# Patient Record
Sex: MALE | Race: WHITE | Employment: FULL TIME | URBAN - METROPOLITAN AREA
[De-identification: names, ages, dates, MRNs, and addresses within clinical notes are randomized per-mention and may not be internally consistent; named-entity substitution may affect disease eponyms.]

---

## 2018-01-25 ENCOUNTER — OFFICE VISIT (OUTPATIENT)
Dept: FAMILY MEDICINE CLINIC | Age: 63
End: 2018-01-25
Payer: COMMERCIAL

## 2018-01-25 VITALS
BODY MASS INDEX: 31.32 KG/M2 | DIASTOLIC BLOOD PRESSURE: 74 MMHG | WEIGHT: 206 LBS | HEART RATE: 83 BPM | SYSTOLIC BLOOD PRESSURE: 118 MMHG | TEMPERATURE: 97.9 F

## 2018-01-25 DIAGNOSIS — J06.9 VIRAL UPPER RESPIRATORY TRACT INFECTION: ICD-10-CM

## 2018-01-25 DIAGNOSIS — J02.9 ACUTE PHARYNGITIS, UNSPECIFIED ETIOLOGY: Primary | ICD-10-CM

## 2018-01-25 LAB — S PYO AG THROAT QL: NORMAL

## 2018-01-25 PROCEDURE — 99213 OFFICE O/P EST LOW 20 MIN: CPT | Performed by: NURSE PRACTITIONER

## 2018-01-25 PROCEDURE — 87880 STREP A ASSAY W/OPTIC: CPT | Performed by: NURSE PRACTITIONER

## 2018-01-25 RX ORDER — CEFDINIR 300 MG/1
300 CAPSULE ORAL 2 TIMES DAILY
Qty: 10 CAPSULE | Refills: 0 | Status: SHIPPED | OUTPATIENT
Start: 2018-01-25 | End: 2018-01-30

## 2018-01-25 ASSESSMENT — ENCOUNTER SYMPTOMS
COUGH: 0
FACIAL SWELLING: 0
SINUS PRESSURE: 0
EYES NEGATIVE: 1
TROUBLE SWALLOWING: 0
SHORTNESS OF BREATH: 0
CHEST TIGHTNESS: 0
HOARSE VOICE: 0
SINUS PAIN: 1
RESPIRATORY NEGATIVE: 1
VOICE CHANGE: 0
SORE THROAT: 1
RHINORRHEA: 1
SWOLLEN GLANDS: 0

## 2018-01-25 NOTE — PROGRESS NOTES
Alcohol use 0.0 oz/week      Comment: occational      Current Outpatient Prescriptions   Medication Sig Dispense Refill    cefdinir (OMNICEF) 300 MG capsule Take 1 capsule by mouth 2 times daily for 5 days 10 capsule 0    tamsulosin (FLOMAX) 0.4 MG capsule Take 0.4 mg by mouth daily      halcinonide (HALOG) 0.1 % CREA Apply topically daily Indications: use as directed       No current facility-administered medications for this visit. Allergies   Allergen Reactions    Amoxicillin     Erythromycin     Tolectin [Tolmetin] Hives       HPI:     Sinusitis   This is a new problem. The current episode started in the past 7 days (4 days ago). The problem has been gradually worsening since onset. There has been no fever. Associated symptoms include congestion, headaches and a sore throat (irritated, no problems swallowing/breathing, swelling of uvula). Pertinent negatives include no chills, coughing, diaphoresis, ear pain, hoarse voice, neck pain, shortness of breath, sinus pressure, sneezing or swollen glands. Past treatments include oral decongestants (Zyrtec-D , Tylenol). The treatment provided mild relief. Eating and drinking OK. .       Subjective:      Review of Systems   Constitutional: Negative. Negative for activity change, appetite change, chills, diaphoresis, fatigue, fever and unexpected weight change. HENT: Positive for congestion, postnasal drip, rhinorrhea, sinus pain and sore throat (irritated, no problems swallowing/breathing, swelling of uvula). Negative for dental problem, drooling, ear discharge, ear pain, facial swelling, hearing loss, hoarse voice, mouth sores, nosebleeds, sinus pressure, sneezing, tinnitus, trouble swallowing and voice change. Eyes: Negative. Respiratory: Negative. Negative for cough, chest tightness and shortness of breath. Musculoskeletal: Negative for neck pain. Neurological: Positive for headaches.        Objective:     Vitals:    01/25/18 0957   BP: 118/74   Pulse: 83   Temp: 97.9 °F (36.6 °C)       Body mass index is 31.32 kg/m². Physical Exam   Constitutional: He is oriented to person, place, and time. He appears well-developed and well-nourished. No distress. HENT:   Head: Normocephalic and atraumatic. Right Ear: Hearing, tympanic membrane, external ear and ear canal normal. Tympanic membrane is not injected and not erythematous. Left Ear: Hearing, tympanic membrane, external ear and ear canal normal. Tympanic membrane is not injected and not erythematous. Nose: Nose normal. No mucosal edema or rhinorrhea. Right sinus exhibits no maxillary sinus tenderness and no frontal sinus tenderness. Left sinus exhibits no maxillary sinus tenderness and no frontal sinus tenderness. Mouth/Throat: Uvula is midline and mucous membranes are normal. Posterior oropharyngeal erythema present. No oropharyngeal exudate or tonsillar abscesses. Tonsils not visible. Posterior pharynx erythremic with some swelling and redness of uvula   No airway obstruction    Eyes: Conjunctivae are normal. Pupils are equal, round, and reactive to light. Right eye exhibits no discharge. Left eye exhibits no discharge. Neck: Normal range of motion. Neck supple. No JVD present. No tracheal deviation present. No thyromegaly present. Cardiovascular: Normal rate, regular rhythm, normal heart sounds and intact distal pulses. Exam reveals no gallop and no friction rub. No murmur heard. Pulmonary/Chest: Effort normal and breath sounds normal. No stridor. No respiratory distress. He has no wheezes. He has no rales. Abdominal: Soft. Musculoskeletal: Normal range of motion. He exhibits no deformity. Lymphadenopathy:     He has cervical adenopathy. Neurological: He is alert and oriented to person, place, and time. GCS eye subscore is 4. GCS verbal subscore is 5. GCS motor subscore is 6. Skin: Skin is warm, dry and intact. No rash noted. He is not diaphoretic. No erythema. No pallor. Psychiatric: He has a normal mood and affect. His behavior is normal. Judgment and thought content normal.         Assessment:         1. Acute pharyngitis, unspecified etiology    2. Viral upper respiratory tract infection        Plan:     1. Acute pharyngitis, unspecified etiology    - POCT rapid strep A  - cefdinir (OMNICEF) 300 MG capsule; Take 1 capsule by mouth 2 times daily for 5 days  Dispense: 10 capsule; Refill: 0  - Throat culture; Future    Rapid negative. Due to throat appearance will treat for strep, only reaction to PCN is headaches not a true allergy. Will send throat culture out, if negative will call to stop antibiotics, if positive may extend. 2. Viral upper respiratory tract infection    Continue Zyrtec D, Tylenol/Motrin for headaches, Mucinex for congestion. Call if questions/concerns      Discussed use, benefit, and side effects of prescribed medications. All patient questions answered. Pt voiced understanding. Reviewed health maintenance. Instructed to continue current medications, diet and exercise. Patient agreed with treatment plan. Follow up as directed.      Electronically signed by Kaelyn Gordon CNP on 1/25/2018

## 2018-01-25 NOTE — PATIENT INSTRUCTIONS
you think you are having a problem with your medicine. You will get more details on the specific medicine your doctor prescribes. · Take an over-the-counter pain medicine, such as acetaminophen (Tylenol), ibuprofen (Advil, Motrin), or naproxen (Aleve), as needed for pain and fever. Read and follow all instructions on the label. Do not give aspirin to anyone younger than 20. It has been linked to Reye syndrome, a serious illness. · Drink plenty of fluids, enough so that your urine is light yellow or clear like water. Hot fluids, such as tea or soup, may help relieve congestion in your nose and throat. If you have kidney, heart, or liver disease and have to limit fluids, talk with your doctor before you increase the amount of fluids you drink. · Try to clear mucus from your lungs by breathing deeply and coughing. · Gargle with warm salt water once an hour. This can help reduce swelling and throat pain. Use 1 teaspoon of salt mixed in 1 cup of warm water. · Do not smoke or allow others to smoke around you. If you need help quitting, talk to your doctor about stop-smoking programs and medicines. These can increase your chances of quitting for good. To avoid spreading the virus  · Cough or sneeze into a tissue. Then throw the tissue away. · If you don't have a tissue, use your hand to cover your cough or sneeze. Then clean your hand. You can also cough into your sleeve. · Wash your hands often. Use soap and warm water. Wash for 15 to 20 seconds each time. · If you don't have soap and water near you, you can clean your hands with alcohol wipes or gel. When should you call for help? Call your doctor now or seek immediate medical care if:  ? · You have a new or higher fever. ? · Your fever lasts more than 48 hours. ? · You have trouble breathing. ? · You have a fever with a stiff neck or a severe headache. ? · You are sensitive to light. ? · You feel very sleepy or confused. ? Watch closely for changes

## 2018-01-31 DIAGNOSIS — J02.9 ACUTE PHARYNGITIS, UNSPECIFIED ETIOLOGY: ICD-10-CM

## 2018-02-08 ENCOUNTER — OFFICE VISIT (OUTPATIENT)
Dept: FAMILY MEDICINE CLINIC | Age: 63
End: 2018-02-08
Payer: COMMERCIAL

## 2018-02-08 VITALS
OXYGEN SATURATION: 98 % | HEIGHT: 69 IN | DIASTOLIC BLOOD PRESSURE: 72 MMHG | SYSTOLIC BLOOD PRESSURE: 118 MMHG | BODY MASS INDEX: 30.6 KG/M2 | WEIGHT: 206.6 LBS | HEART RATE: 74 BPM

## 2018-02-08 DIAGNOSIS — Z00.00 ROUTINE GENERAL MEDICAL EXAMINATION AT A HEALTH CARE FACILITY: Primary | ICD-10-CM

## 2018-02-08 DIAGNOSIS — Z12.11 SCREEN FOR COLON CANCER: ICD-10-CM

## 2018-02-08 DIAGNOSIS — Z51.81 MEDICATION MONITORING ENCOUNTER: ICD-10-CM

## 2018-02-08 DIAGNOSIS — Z11.59 ENCOUNTER FOR HEPATITIS C SCREENING TEST FOR LOW RISK PATIENT: ICD-10-CM

## 2018-02-08 DIAGNOSIS — Z12.5 SCREENING FOR PROSTATE CANCER: ICD-10-CM

## 2018-02-08 DIAGNOSIS — Z13.220 SCREENING FOR LIPID DISORDERS: ICD-10-CM

## 2018-02-08 PROCEDURE — 99396 PREV VISIT EST AGE 40-64: CPT | Performed by: FAMILY MEDICINE

## 2018-02-08 RX ORDER — ALFUZOSIN HYDROCHLORIDE 10 MG/1
10 TABLET, EXTENDED RELEASE ORAL DAILY
COMMUNITY
Start: 2018-02-05

## 2018-02-08 ASSESSMENT — ENCOUNTER SYMPTOMS
APNEA: 0
PHOTOPHOBIA: 0
CHEST TIGHTNESS: 0
BLOOD IN STOOL: 0
NAUSEA: 0
EYE PAIN: 0
ABDOMINAL DISTENTION: 0
EYE REDNESS: 0
CONSTIPATION: 0
ABDOMINAL PAIN: 0
VOMITING: 0
STRIDOR: 0
DIARRHEA: 0
SHORTNESS OF BREATH: 0
RHINORRHEA: 0
BACK PAIN: 0
CHOKING: 0
COLOR CHANGE: 0
WHEEZING: 0
EYE ITCHING: 0
SORE THROAT: 0
SINUS PRESSURE: 0
EYE DISCHARGE: 0
COUGH: 0

## 2018-02-08 ASSESSMENT — PATIENT HEALTH QUESTIONNAIRE - PHQ9
1. LITTLE INTEREST OR PLEASURE IN DOING THINGS: 0
SUM OF ALL RESPONSES TO PHQ QUESTIONS 1-9: 0
2. FEELING DOWN, DEPRESSED OR HOPELESS: 0
SUM OF ALL RESPONSES TO PHQ9 QUESTIONS 1 & 2: 0

## 2018-02-08 NOTE — PROGRESS NOTES
membrane is not erythematous and not bulging. Left Ear: Tympanic membrane is not erythematous and not bulging. Nose: No mucosal edema or rhinorrhea. Mouth/Throat: Uvula is midline, oropharynx is clear and moist and mucous membranes are normal.   Eyes: Conjunctivae and EOM are normal. Pupils are equal, round, and reactive to light. Neck: Trachea normal, normal range of motion and full passive range of motion without pain. Neck supple. No JVD present. Carotid bruit is not present. Cardiovascular: Normal rate, regular rhythm, S1 normal, S2 normal, normal heart sounds and normal pulses. Exam reveals no gallop, no S3, no S4, no distant heart sounds and no friction rub. No murmur heard. No systolic murmur is present   Pulmonary/Chest: Effort normal and breath sounds normal.   Abdominal: Normal appearance and bowel sounds are normal. There is no tenderness. Lymphadenopathy:     He has no cervical adenopathy. Right cervical: No superficial cervical and no deep cervical adenopathy present. Left cervical: No superficial cervical and no deep cervical adenopathy present. Neurological: He is alert. He has normal strength. No cranial nerve deficit or sensory deficit. He displays a negative Romberg sign. Reflex Scores:       Brachioradialis reflexes are 2+ on the right side and 2+ on the left side. Patellar reflexes are 2+ on the right side and 2+ on the left side. Achilles reflexes are 2+ on the right side and 2+ on the left side. Skin: Skin is warm, dry and intact. He is not diaphoretic. No pallor. Psychiatric: He has a normal mood and affect. His speech is normal and behavior is normal. Judgment and thought content normal. Cognition and memory are normal.       Assessment:          Plan:      1.  Routine general medical examination at a health care facility  - I generally recommend that people of all ages try to get 150 minutes of physical activity per week and it doesnt matter how this totals up, in other words 30 minutes 5 days per week is as good as 50 minutes 3 days a week and so on. The level of activity should be such that it is able to get your heart rate up to 100 or more, for example a brisk walk should achieve this rate. - In terms of diet, I generally recommend trying to avoid processed foods wherever possible (anything that comes in a can or a box) which can be achieved by sticking to the outside walls of the grocery store where generally you will find fresh fruits/vegetables, meats, dairy, and frozen foods.    - Try to avoid starches in the diet where possible and minimize bread, rice, potatoes, and pasta in the diet. Specifically try to avoid gluten, which even in people that dont have a tsering allergy, causes havoc in the small intestine and alters absorption of nutrients which can in turn lead to obesity.   - Try to achieve a regular sleep schedule, waking and laying down at the same time each night. Most people need 7 hours per night plus or minus 2 hours. You will know that youre getting enough because you will wake feeling refreshed and not need to sleep in to catch up on weekends. - Make sure that you dont neglect your skin, I recommend an SPF 30 or higher sun screen any time that you plan to be in the sun for more than 20 minutes, even in the winter or on cloudy days (keep in mind that UV light penetrates clouds and can cause burns even on cloudy days). - Finally, make sure that you always have something to look forward to whether this is a vacation, a special event, or just a weekend off work. Having something to look forward to helps to maintain positive focus and is good for mental health. - PSA Screening; Future  - Comprehensive Metabolic Panel; Future  - Lipid Panel; Future  - Hepatitis C Antibody; Future  - POCT Fecal Immunochemical Test (FIT); Future    2. Screening for lipid disorders  - Lipid Panel; Future    3.  Screening for prostate

## 2019-05-20 ENCOUNTER — OFFICE VISIT (OUTPATIENT)
Dept: FAMILY MEDICINE CLINIC | Age: 64
End: 2019-05-20
Payer: COMMERCIAL

## 2019-05-20 ENCOUNTER — TELEPHONE (OUTPATIENT)
Dept: FAMILY MEDICINE CLINIC | Age: 64
End: 2019-05-20

## 2019-05-20 VITALS
HEIGHT: 68 IN | OXYGEN SATURATION: 97 % | HEART RATE: 76 BPM | DIASTOLIC BLOOD PRESSURE: 70 MMHG | SYSTOLIC BLOOD PRESSURE: 118 MMHG | WEIGHT: 205 LBS | BODY MASS INDEX: 31.07 KG/M2 | RESPIRATION RATE: 18 BRPM

## 2019-05-20 DIAGNOSIS — Z12.11 SCREENING FOR COLON CANCER: ICD-10-CM

## 2019-05-20 DIAGNOSIS — S49.92XA INJURY OF LEFT SHOULDER, INITIAL ENCOUNTER: Primary | ICD-10-CM

## 2019-05-20 PROCEDURE — G8427 DOCREV CUR MEDS BY ELIG CLIN: HCPCS | Performed by: NURSE PRACTITIONER

## 2019-05-20 PROCEDURE — 1036F TOBACCO NON-USER: CPT | Performed by: NURSE PRACTITIONER

## 2019-05-20 PROCEDURE — 3017F COLORECTAL CA SCREEN DOC REV: CPT | Performed by: NURSE PRACTITIONER

## 2019-05-20 PROCEDURE — 99213 OFFICE O/P EST LOW 20 MIN: CPT | Performed by: NURSE PRACTITIONER

## 2019-05-20 PROCEDURE — G8417 CALC BMI ABV UP PARAM F/U: HCPCS | Performed by: NURSE PRACTITIONER

## 2019-05-20 RX ORDER — FINASTERIDE 5 MG/1
5 TABLET, FILM COATED ORAL DAILY
COMMUNITY
Start: 2019-03-13

## 2019-05-20 ASSESSMENT — PATIENT HEALTH QUESTIONNAIRE - PHQ9
1. LITTLE INTEREST OR PLEASURE IN DOING THINGS: 0
SUM OF ALL RESPONSES TO PHQ QUESTIONS 1-9: 0
SUM OF ALL RESPONSES TO PHQ9 QUESTIONS 1 & 2: 0
SUM OF ALL RESPONSES TO PHQ QUESTIONS 1-9: 0
2. FEELING DOWN, DEPRESSED OR HOPELESS: 0

## 2019-05-20 ASSESSMENT — ENCOUNTER SYMPTOMS
GASTROINTESTINAL NEGATIVE: 1
ALLERGIC/IMMUNOLOGIC NEGATIVE: 1
RESPIRATORY NEGATIVE: 1
COLOR CHANGE: 0
EYES NEGATIVE: 1

## 2019-06-11 ENCOUNTER — OFFICE VISIT (OUTPATIENT)
Dept: FAMILY MEDICINE CLINIC | Age: 64
End: 2019-06-11
Payer: COMMERCIAL

## 2019-06-11 ENCOUNTER — HOSPITAL ENCOUNTER (OUTPATIENT)
Age: 64
Setting detail: SPECIMEN
Discharge: HOME | End: 2019-06-11
Payer: COMMERCIAL

## 2019-06-11 VITALS
BODY MASS INDEX: 30.62 KG/M2 | OXYGEN SATURATION: 97 % | DIASTOLIC BLOOD PRESSURE: 86 MMHG | HEIGHT: 68 IN | SYSTOLIC BLOOD PRESSURE: 122 MMHG | WEIGHT: 202 LBS | HEART RATE: 73 BPM

## 2019-06-11 DIAGNOSIS — Z12.5 SCREENING FOR PROSTATE CANCER: ICD-10-CM

## 2019-06-11 DIAGNOSIS — Z00.01 ENCOUNTER FOR GENERAL ADULT MEDICAL EXAMINATION WITH ABNORMAL FINDINGS: Primary | ICD-10-CM

## 2019-06-11 DIAGNOSIS — Z12.11 SCREEN FOR COLON CANCER: ICD-10-CM

## 2019-06-11 DIAGNOSIS — Z51.81 MEDICATION MONITORING ENCOUNTER: ICD-10-CM

## 2019-06-11 DIAGNOSIS — Z00.01 ENCOUNTER FOR GENERAL ADULT MEDICAL EXAMINATION WITH ABNORMAL FINDINGS: ICD-10-CM

## 2019-06-11 DIAGNOSIS — Z11.59 ENCOUNTER FOR HEPATITIS C SCREENING TEST FOR LOW RISK PATIENT: ICD-10-CM

## 2019-06-11 DIAGNOSIS — Z13.1 SCREENING FOR DIABETES MELLITUS: ICD-10-CM

## 2019-06-11 DIAGNOSIS — Z11.4 SCREENING FOR HIV (HUMAN IMMUNODEFICIENCY VIRUS): ICD-10-CM

## 2019-06-11 DIAGNOSIS — Z23 IMMUNIZATION DUE: ICD-10-CM

## 2019-06-11 LAB
ALBUMIN SERPL-MCNC: 4.5 G/DL (ref 3.5–5.2)
ALBUMIN/GLOBULIN RATIO: 1.7 (ref 1–2.5)
ALP BLD-CCNC: 77 U/L (ref 40–129)
ALT SERPL-CCNC: 27 U/L (ref 5–41)
ANION GAP SERPL CALCULATED.3IONS-SCNC: 17 MMOL/L (ref 9–17)
AST SERPL-CCNC: 25 U/L
BILIRUB SERPL-MCNC: 0.53 MG/DL (ref 0.3–1.2)
BUN BLDV-MCNC: 12 MG/DL (ref 8–23)
BUN/CREAT BLD: ABNORMAL (ref 9–20)
CALCIUM SERPL-MCNC: 9.2 MG/DL (ref 8.6–10.4)
CHLORIDE BLD-SCNC: 106 MMOL/L (ref 98–107)
CO2: 22 MMOL/L (ref 20–31)
CREAT SERPL-MCNC: 0.79 MG/DL (ref 0.7–1.2)
ESTIMATED AVERAGE GLUCOSE: 108 MG/DL
GFR AFRICAN AMERICAN: >60 ML/MIN
GFR NON-AFRICAN AMERICAN: >60 ML/MIN
GFR SERPL CREATININE-BSD FRML MDRD: ABNORMAL ML/MIN/{1.73_M2}
GFR SERPL CREATININE-BSD FRML MDRD: ABNORMAL ML/MIN/{1.73_M2}
GLUCOSE BLD-MCNC: 88 MG/DL (ref 70–99)
HBA1C MFR BLD: 5.4 % (ref 4–6)
HEPATITIS C ANTIBODY: NONREACTIVE
HIV AG/AB: NONREACTIVE
POTASSIUM SERPL-SCNC: 4.4 MMOL/L (ref 3.7–5.3)
PROSTATE SPECIFIC ANTIGEN: 3.01 UG/L
SODIUM BLD-SCNC: 145 MMOL/L (ref 135–144)
TOTAL PROTEIN: 7.2 G/DL (ref 6.4–8.3)

## 2019-06-11 PROCEDURE — 99396 PREV VISIT EST AGE 40-64: CPT | Performed by: FAMILY MEDICINE

## 2019-06-11 RX ORDER — PREDNISONE 20 MG/1
TABLET ORAL
Qty: 18 TABLET | Refills: 0 | Status: SHIPPED | OUTPATIENT
Start: 2019-06-11 | End: 2019-06-21

## 2019-06-11 ASSESSMENT — ENCOUNTER SYMPTOMS
EYE ITCHING: 0
EYE DISCHARGE: 0
ABDOMINAL DISTENTION: 0
APNEA: 0
SHORTNESS OF BREATH: 0
BLOOD IN STOOL: 0
COUGH: 0
COLOR CHANGE: 0
SINUS PRESSURE: 0
WHEEZING: 0
EYE PAIN: 0
STRIDOR: 0
SORE THROAT: 0
CHOKING: 0
CHEST TIGHTNESS: 0
EYE REDNESS: 0
BACK PAIN: 0
ABDOMINAL PAIN: 0
VOMITING: 0
RHINORRHEA: 0
CONSTIPATION: 0
PHOTOPHOBIA: 0
DIARRHEA: 0
NAUSEA: 0

## 2019-06-11 NOTE — PROGRESS NOTES
Visit Information    Have you changed or started any medications since your last visit including any over-the-counter medicines, vitamins, or herbal medicines? no   Have you stopped taking any of your medications? Is so, why? -  no  Are you having any side effects from any of your medications? - no    Have you seen any other physician or provider since your last visit?  no   Have you had any other diagnostic tests since your last visit?  no   Have you been seen in the emergency room and/or had an admission in a hospital since we last saw you?  no   Have you had your routine dental cleaning in the past 6 months? Do you have an active MyChart account? If no, what is the barrier?   Yes    Patient Care Team:  Graeme Gamez MD as PCP - Bi Britton MD as PCP - [facility] Provider  Diams Sanders as Consulting Physician (Urology)    Medical History Review  Past Medical, Family, and Social History reviewed and contribute to the patient presenting condition    Health Maintenance   Topic Date Due    Hepatitis C screen  1955    HIV screen  08/19/1972    Diabetes screen  08/19/1997    Shingles Vaccine (1 of 2) 08/19/2007    DTaP/Tdap/Td vaccine (2 - Td) 04/13/2017    Colon cancer screen colonoscopy  01/01/2018    Lipid screen  07/07/2019    Flu vaccine (Season Ended) 09/01/2019    Pneumococcal 0-64 years Vaccine  Aged Out

## 2019-06-11 NOTE — PROGRESS NOTES
Kasi Dai is a 66 y.o. male who presents todayfor his medical conditions/complaints as noted below. Kasi Dai is here today c/oAnnual Exam      HPI:      HPI    Here for annual well check and had been having some left shoulder pain and had been in to see Rick Parker recently. Diet has been healthy and he has been staying well hydrated. Physical activity is at goal and sleep is restful. Subjective:   Review of Systems   Constitutional: Negative for activity change, appetite change, chills, diaphoresis, fatigue, fever and unexpected weight change. HENT: Negative for congestion, dental problem, ear pain, hearing loss, mouth sores, nosebleeds, postnasal drip, rhinorrhea, sinus pressure and sore throat. Eyes: Negative for photophobia, pain, discharge, redness, itching and visual disturbance. Respiratory: Negative for apnea, cough, choking, chest tightness, shortness of breath, wheezing and stridor. Cardiovascular: Negative for chest pain, palpitations and leg swelling. Gastrointestinal: Negative for abdominal distention, abdominal pain, blood in stool, constipation, diarrhea, nausea and vomiting. Genitourinary: Negative for decreased urine volume, difficulty urinating, dysuria, flank pain, frequency, scrotal swelling, testicular pain and urgency. Musculoskeletal: Negative for back pain, gait problem, joint swelling, myalgias, neck pain and neck stiffness. Skin: Negative for color change, pallor and rash. Neurological: Negative for dizziness, tremors, seizures, syncope, facial asymmetry, speech difficulty, weakness, light-headedness, numbness and headaches. Psychiatric/Behavioral: Negative for agitation, behavioral problems, decreased concentration, sleep disturbance and suicidal ideas. The patient is not nervous/anxious.         Objective:    /86 (Site: Left Upper Arm, Position: Sitting, Cuff Size: Medium Adult)   Pulse 73   Ht 5' 7.72\" (1.72 m)   Wt 202 lb (91.6 kg) SpO2 97%   BMI 30.97 kg/m²     Physical Exam   Constitutional: He appears well-developed and well-nourished. HENT:   Head: Normocephalic. Right Ear: Tympanic membrane is not erythematous and not bulging. Left Ear: Tympanic membrane is not erythematous and not bulging. Nose: No mucosal edema or rhinorrhea. Mouth/Throat: Uvula is midline, oropharynx is clear and moist and mucous membranes are normal.   Eyes: Pupils are equal, round, and reactive to light. Conjunctivae and EOM are normal.   Neck: Trachea normal, normal range of motion and full passive range of motion without pain. Neck supple. No JVD present. Carotid bruit is not present. Cardiovascular: Normal rate, regular rhythm, S1 normal, S2 normal, normal heart sounds and normal pulses. Exam reveals no gallop, no S3, no S4, no distant heart sounds and no friction rub. No murmur heard. No systolic murmur is present. Pulmonary/Chest: Effort normal and breath sounds normal.   Abdominal: Normal appearance and bowel sounds are normal. There is no tenderness. Lymphadenopathy:     He has no cervical adenopathy. Right cervical: No superficial cervical and no deep cervical adenopathy present. Left cervical: No superficial cervical and no deep cervical adenopathy present. Neurological: He is alert. He has normal strength. No cranial nerve deficit or sensory deficit. He displays a negative Romberg sign. Reflex Scores:       Brachioradialis reflexes are 2+ on the right side and 2+ on the left side. Patellar reflexes are 2+ on the right side and 2+ on the left side. Achilles reflexes are 2+ on the right side and 2+ on the left side. Skin: Skin is warm, dry and intact. He is not diaphoretic. No pallor. Psychiatric: He has a normal mood and affect. His speech is normal and behavior is normal. Judgment and thought content normal. Cognition and memory are normal.       Assessment & Plan:     1.  Encounter for general adult medical examination with abnormal findings  - I generally recommend that people of all ages try to get 150 minutes of physical activity per week and it doesnt matter how this totals up, in other words 30 minutes 5 days per week is as good as 50 minutes 3 days a week and so on. The level of activity should be such that it is able to get your heart rate up to 100 or more, for example a brisk walk should achieve this rate. - In terms of diet, I generally recommend trying to avoid processed foods wherever possible (anything that comes in a can or a box) which can be achieved by sticking to the outside walls of the grocery store where generally you will find fresh fruits/vegetables, meats, dairy, and frozen foods.    - Try to avoid starches in the diet where possible and minimize bread, rice, potatoes, and pasta in the diet. Specifically try to avoid gluten, which even in people that dont have a tsering allergy, causes havoc in the small intestine and alters absorption of nutrients which can in turn lead to obesity.   - Try to achieve a regular sleep schedule, waking and laying down at the same time each night. Most people need 7 hours per night plus or minus 2 hours. You will know that youre getting enough because you will wake feeling refreshed and not need to sleep in to catch up on weekends. - Make sure that you dont neglect your skin, I recommend an SPF 30 or higher sun screen any time that you plan to be in the sun for more than 20 minutes, even in the winter or on cloudy days (keep in mind that UV light penetrates clouds and can cause burns even on cloudy days). - Finally, make sure that you always have something to look forward to whether this is a vacation, a special event, or just a weekend off work. Having something to look forward to helps to maintain positive focus and is good for mental health. - Hepatitis C Antibody; Future  - HIV Screen; Future  - Comprehensive Metabolic Panel;  Future  - PSA Screening; Future  - COLOGUARD (For external results only)  - zoster recombinant adjuvanted vaccine (SHINGRIX) 50 MCG/0.5ML SUSR injection; Inject 0.5 mLs into the muscle once for 1 dose  Dispense: 0.5 mL; Refill: 1  - Hemoglobin A1C; Future    2. Encounter for hepatitis C screening test for low risk patient  - Hepatitis C Antibody; Future    3. Screening for HIV (human immunodeficiency virus)  - HIV Screen; Future    4. Medication monitoring encounter  - Comprehensive Metabolic Panel; Future    5. Screening for prostate cancer  - PSA Screening; Future    6. Screen for colon cancer  - COLOGUARD (For external results only)    7. Immunization due  Declined TDaP. - zoster recombinant adjuvanted vaccine [facility]) 50 MCG/0.5ML SUSR injection; Inject 0.5 mLs into the muscle once for 1 dose  Dispense: 0.5 mL; Refill: 1    8.  Screening for diabetes mellitus  - Hemoglobin A1C; Future

## 2019-07-29 ENCOUNTER — OFFICE VISIT (OUTPATIENT)
Dept: FAMILY MEDICINE CLINIC | Age: 64
End: 2019-07-29
Payer: COMMERCIAL

## 2019-07-29 VITALS
OXYGEN SATURATION: 98 % | DIASTOLIC BLOOD PRESSURE: 70 MMHG | HEART RATE: 76 BPM | BODY MASS INDEX: 31.28 KG/M2 | SYSTOLIC BLOOD PRESSURE: 114 MMHG | WEIGHT: 204 LBS

## 2019-07-29 DIAGNOSIS — M75.82 TENDINITIS OF LEFT ROTATOR CUFF: Primary | ICD-10-CM

## 2019-07-29 PROCEDURE — G8427 DOCREV CUR MEDS BY ELIG CLIN: HCPCS | Performed by: FAMILY MEDICINE

## 2019-07-29 PROCEDURE — 96372 THER/PROPH/DIAG INJ SC/IM: CPT | Performed by: FAMILY MEDICINE

## 2019-07-29 PROCEDURE — 3017F COLORECTAL CA SCREEN DOC REV: CPT | Performed by: FAMILY MEDICINE

## 2019-07-29 PROCEDURE — G8417 CALC BMI ABV UP PARAM F/U: HCPCS | Performed by: FAMILY MEDICINE

## 2019-07-29 PROCEDURE — 1036F TOBACCO NON-USER: CPT | Performed by: FAMILY MEDICINE

## 2019-07-29 PROCEDURE — 99213 OFFICE O/P EST LOW 20 MIN: CPT | Performed by: FAMILY MEDICINE

## 2019-07-29 RX ORDER — METHYLPREDNISOLONE ACETATE 80 MG/ML
80 INJECTION, SUSPENSION INTRA-ARTICULAR; INTRALESIONAL; INTRAMUSCULAR; SOFT TISSUE ONCE
Status: COMPLETED | OUTPATIENT
Start: 2019-07-29 | End: 2019-07-29

## 2019-07-29 RX ADMIN — METHYLPREDNISOLONE ACETATE 80 MG: 80 INJECTION, SUSPENSION INTRA-ARTICULAR; INTRALESIONAL; INTRAMUSCULAR; SOFT TISSUE at 11:42

## 2019-07-29 ASSESSMENT — ENCOUNTER SYMPTOMS
BLOOD IN STOOL: 0
SHORTNESS OF BREATH: 0
WHEEZING: 0
COUGH: 0
ABDOMINAL PAIN: 0
BACK PAIN: 0
DIARRHEA: 0
CONSTIPATION: 0

## 2019-08-22 ENCOUNTER — OFFICE VISIT (OUTPATIENT)
Dept: FAMILY MEDICINE CLINIC | Age: 64
End: 2019-08-22
Payer: COMMERCIAL

## 2019-08-22 VITALS
WEIGHT: 200.4 LBS | HEART RATE: 69 BPM | SYSTOLIC BLOOD PRESSURE: 120 MMHG | BODY MASS INDEX: 30.73 KG/M2 | OXYGEN SATURATION: 97 % | DIASTOLIC BLOOD PRESSURE: 84 MMHG

## 2019-08-22 DIAGNOSIS — M75.82 TENDINITIS OF LEFT ROTATOR CUFF: Primary | ICD-10-CM

## 2019-08-22 PROCEDURE — 1036F TOBACCO NON-USER: CPT | Performed by: FAMILY MEDICINE

## 2019-08-22 PROCEDURE — G8417 CALC BMI ABV UP PARAM F/U: HCPCS | Performed by: FAMILY MEDICINE

## 2019-08-22 PROCEDURE — 3017F COLORECTAL CA SCREEN DOC REV: CPT | Performed by: FAMILY MEDICINE

## 2019-08-22 PROCEDURE — G8427 DOCREV CUR MEDS BY ELIG CLIN: HCPCS | Performed by: FAMILY MEDICINE

## 2019-08-22 PROCEDURE — 99213 OFFICE O/P EST LOW 20 MIN: CPT | Performed by: FAMILY MEDICINE

## 2019-08-22 ASSESSMENT — ENCOUNTER SYMPTOMS
BLOOD IN STOOL: 0
DIARRHEA: 0
CONSTIPATION: 0
WHEEZING: 0
SHORTNESS OF BREATH: 0
COUGH: 0
BACK PAIN: 0
ABDOMINAL PAIN: 0

## 2019-08-22 NOTE — PROGRESS NOTES
Naresh Hope is a 60 y.o. male who presents todayfor his medical conditions/complaints as noted below. Naresh Hope is here today c/oShoulder Pain (left- continued no improvement now into neck and jaw)      : HPI    Ongoing left shoulder pain that is effecting his work and progressing. Past Medical History:   Diagnosis Date    BPH (benign prostatic hypertrophy)     Calculus, kidney     Heel spur     Plantar fasciitis       Past Surgical History:   Procedure Laterality Date    CYSTOSCOPY      HEMORRHOID SURGERY      NASAL POLYP SURGERY       No family history on file. Social History     Tobacco Use    Smoking status: Never Smoker    Smokeless tobacco: Never Used   Substance Use Topics    Alcohol use: Yes     Alcohol/week: 0.0 standard drinks     Comment: occational      Current Outpatient Medications   Medication Sig Dispense Refill    finasteride (PROSCAR) 5 MG tablet Take 5 mg by mouth      alfuzosin (UROXATRAL) 10 MG extended release tablet       halcinonide (HALOG) 0.1 % CREA Apply topically daily Indications: use as directed       No current facility-administered medications for this visit. Allergies   Allergen Reactions    Amoxicillin      \"headache\"    Erythromycin Hives    Tolectin [Tolmetin] Hives         Subjective:   Review of Systems   Constitutional: Negative for chills, diaphoresis, fatigue and fever. HENT: Negative for congestion and hearing loss. Eyes: Negative for visual disturbance. Respiratory: Negative for cough, shortness of breath and wheezing. Cardiovascular: Negative for chest pain, palpitations and leg swelling. Gastrointestinal: Negative for abdominal pain, blood in stool, constipation and diarrhea. Genitourinary: Negative for dysuria. Musculoskeletal: Negative for arthralgias, back pain, gait problem and neck pain. Skin: Negative for rash. Neurological: Negative for weakness, numbness and headaches.    Psychiatric/Behavioral:

## 2019-08-27 ENCOUNTER — HOSPITAL ENCOUNTER (OUTPATIENT)
Dept: MRI IMAGING | Facility: CLINIC | Age: 64
Discharge: HOME | End: 2019-08-29
Payer: COMMERCIAL

## 2019-08-27 DIAGNOSIS — M75.82 TENDINITIS OF LEFT ROTATOR CUFF: ICD-10-CM

## 2019-08-27 PROCEDURE — 73221 MRI JOINT UPR EXTREM W/O DYE: CPT

## 2019-08-28 DIAGNOSIS — M25.512 ACUTE PAIN OF LEFT SHOULDER: Primary | ICD-10-CM

## 2019-08-29 ENCOUNTER — OFFICE VISIT (OUTPATIENT)
Dept: ORTHOPEDIC SURGERY | Age: 64
End: 2019-08-29
Payer: COMMERCIAL

## 2019-08-29 VITALS — HEIGHT: 68 IN | WEIGHT: 195 LBS | BODY MASS INDEX: 29.55 KG/M2

## 2019-08-29 DIAGNOSIS — M75.122 COMPLETE TEAR OF LEFT ROTATOR CUFF, UNSPECIFIED WHETHER TRAUMATIC: Primary | ICD-10-CM

## 2019-08-29 PROCEDURE — G8417 CALC BMI ABV UP PARAM F/U: HCPCS | Performed by: ORTHOPAEDIC SURGERY

## 2019-08-29 PROCEDURE — 99203 OFFICE O/P NEW LOW 30 MIN: CPT | Performed by: ORTHOPAEDIC SURGERY

## 2019-08-29 PROCEDURE — 3017F COLORECTAL CA SCREEN DOC REV: CPT | Performed by: ORTHOPAEDIC SURGERY

## 2019-08-29 PROCEDURE — G8427 DOCREV CUR MEDS BY ELIG CLIN: HCPCS | Performed by: ORTHOPAEDIC SURGERY

## 2019-08-29 PROCEDURE — 1036F TOBACCO NON-USER: CPT | Performed by: ORTHOPAEDIC SURGERY

## 2019-08-29 RX ORDER — DICLOFENAC SODIUM 75 MG/1
75 TABLET, DELAYED RELEASE ORAL 2 TIMES DAILY WITH MEALS
Qty: 28 TABLET | Refills: 0 | Status: SHIPPED | OUTPATIENT
Start: 2019-08-29 | End: 2019-12-19

## 2019-08-29 NOTE — LETTER
8/29/2019    No referring provider defined for this encounter. RE: Daisha Paul    Dear Dr. Marilynne Epley ref. provider found,    Thank you for allowing me to participate in the care of Mr. Henry Angel. I had the opportunity to evaluate the patient on 8/29/2019. Attached you will find my evaluation and recommendations. Thanks again for the confidence you have expressed in me by allowing my participation in the care of your patient. I will keep you apprised of further developments in the patients treatment course as it progresses. If I can be of further assistance in any fashion, please feel free to contact me at your convenience.     Sincerely,        Ralph Shelton  Shoulder and Elbow Surgery

## 2019-09-08 PROBLEM — M75.122 COMPLETE TEAR OF LEFT ROTATOR CUFF: Status: ACTIVE | Noted: 2019-09-08

## 2019-12-19 ENCOUNTER — OFFICE VISIT (OUTPATIENT)
Dept: FAMILY MEDICINE CLINIC | Age: 64
End: 2019-12-19
Payer: COMMERCIAL

## 2019-12-19 VITALS
OXYGEN SATURATION: 96 % | HEART RATE: 82 BPM | SYSTOLIC BLOOD PRESSURE: 128 MMHG | DIASTOLIC BLOOD PRESSURE: 88 MMHG | TEMPERATURE: 98.3 F

## 2019-12-19 DIAGNOSIS — J01.90 ACUTE BACTERIAL SINUSITIS: Primary | ICD-10-CM

## 2019-12-19 DIAGNOSIS — B96.89 ACUTE BACTERIAL SINUSITIS: Primary | ICD-10-CM

## 2019-12-19 PROCEDURE — 3017F COLORECTAL CA SCREEN DOC REV: CPT | Performed by: NURSE PRACTITIONER

## 2019-12-19 PROCEDURE — G8417 CALC BMI ABV UP PARAM F/U: HCPCS | Performed by: NURSE PRACTITIONER

## 2019-12-19 PROCEDURE — G8427 DOCREV CUR MEDS BY ELIG CLIN: HCPCS | Performed by: NURSE PRACTITIONER

## 2019-12-19 PROCEDURE — 99214 OFFICE O/P EST MOD 30 MIN: CPT | Performed by: NURSE PRACTITIONER

## 2019-12-19 PROCEDURE — 1036F TOBACCO NON-USER: CPT | Performed by: NURSE PRACTITIONER

## 2019-12-19 PROCEDURE — G8484 FLU IMMUNIZE NO ADMIN: HCPCS | Performed by: NURSE PRACTITIONER

## 2019-12-19 RX ORDER — DOXYCYCLINE HYCLATE 100 MG/1
100 CAPSULE ORAL 2 TIMES DAILY
Qty: 20 CAPSULE | Refills: 0 | Status: SHIPPED | OUTPATIENT
Start: 2019-12-19 | End: 2019-12-29

## 2019-12-19 RX ORDER — FLUTICASONE PROPIONATE 50 MCG
2 SPRAY, SUSPENSION (ML) NASAL DAILY
Qty: 1 BOTTLE | Refills: 0 | Status: SHIPPED | OUTPATIENT
Start: 2019-12-19 | End: 2022-02-15

## 2019-12-19 ASSESSMENT — ENCOUNTER SYMPTOMS
SORE THROAT: 1
SHORTNESS OF BREATH: 0
COUGH: 1
EYE REDNESS: 0
WHEEZING: 0
SINUS PRESSURE: 0
CHEST TIGHTNESS: 0
VOICE CHANGE: 0
EYE DISCHARGE: 0

## 2022-02-15 ENCOUNTER — HOSPITAL ENCOUNTER (OUTPATIENT)
Age: 67
Setting detail: SPECIMEN
Discharge: HOME | End: 2022-02-15

## 2022-02-15 ENCOUNTER — OFFICE VISIT (OUTPATIENT)
Dept: FAMILY MEDICINE CLINIC | Age: 67
End: 2022-02-15
Payer: COMMERCIAL

## 2022-02-15 VITALS
OXYGEN SATURATION: 98 % | BODY MASS INDEX: 30.55 KG/M2 | HEART RATE: 73 BPM | WEIGHT: 201.6 LBS | DIASTOLIC BLOOD PRESSURE: 84 MMHG | SYSTOLIC BLOOD PRESSURE: 126 MMHG | HEIGHT: 68 IN | TEMPERATURE: 97 F

## 2022-02-15 DIAGNOSIS — Z13.220 SCREENING CHOLESTEROL LEVEL: ICD-10-CM

## 2022-02-15 DIAGNOSIS — K21.9 GASTROESOPHAGEAL REFLUX DISEASE WITHOUT ESOPHAGITIS: ICD-10-CM

## 2022-02-15 DIAGNOSIS — Z00.00 ENCOUNTER FOR MEDICAL EXAMINATION TO ESTABLISH CARE: ICD-10-CM

## 2022-02-15 DIAGNOSIS — Z13.31 DEPRESSION SCREENING NEGATIVE: ICD-10-CM

## 2022-02-15 DIAGNOSIS — R07.9 CHEST PAIN, UNSPECIFIED TYPE: ICD-10-CM

## 2022-02-15 DIAGNOSIS — Z13.29 THYROID DISORDER SCREEN: ICD-10-CM

## 2022-02-15 DIAGNOSIS — Z12.5 PROSTATE CANCER SCREENING: ICD-10-CM

## 2022-02-15 DIAGNOSIS — R07.9 CHEST PAIN, UNSPECIFIED TYPE: Primary | ICD-10-CM

## 2022-02-15 DIAGNOSIS — R06.02 SOB (SHORTNESS OF BREATH): ICD-10-CM

## 2022-02-15 LAB
ABSOLUTE EOS #: 0.3 K/UL (ref 0–0.44)
ABSOLUTE IMMATURE GRANULOCYTE: <0.03 K/UL (ref 0–0.3)
ABSOLUTE LYMPH #: 1.24 K/UL (ref 1.1–3.7)
ABSOLUTE MONO #: 0.59 K/UL (ref 0.1–1.2)
ALBUMIN SERPL-MCNC: 4.6 G/DL (ref 3.5–5.2)
ALBUMIN/GLOBULIN RATIO: 1.6 (ref 1–2.5)
ALP BLD-CCNC: 92 U/L (ref 40–129)
ALT SERPL-CCNC: 31 U/L (ref 5–41)
ANION GAP SERPL CALCULATED.3IONS-SCNC: 24 MMOL/L (ref 9–17)
AST SERPL-CCNC: 31 U/L
BASOPHILS # BLD: 1 % (ref 0–2)
BASOPHILS ABSOLUTE: 0.04 K/UL (ref 0–0.2)
BILIRUB SERPL-MCNC: 0.33 MG/DL (ref 0.3–1.2)
BUN BLDV-MCNC: 17 MG/DL (ref 8–23)
BUN/CREAT BLD: ABNORMAL (ref 9–20)
CALCIUM SERPL-MCNC: 10.5 MG/DL (ref 8.6–10.4)
CHLORIDE BLD-SCNC: 104 MMOL/L (ref 98–107)
CHOLESTEROL, FASTING: 179 MG/DL
CHOLESTEROL/HDL RATIO: 3.7
CO2: 19 MMOL/L (ref 20–31)
CREAT SERPL-MCNC: 0.97 MG/DL (ref 0.7–1.2)
DIFFERENTIAL TYPE: ABNORMAL
EOSINOPHILS RELATIVE PERCENT: 5 % (ref 1–4)
GFR AFRICAN AMERICAN: >60 ML/MIN
GFR NON-AFRICAN AMERICAN: >60 ML/MIN
GFR SERPL CREATININE-BSD FRML MDRD: ABNORMAL ML/MIN/{1.73_M2}
GFR SERPL CREATININE-BSD FRML MDRD: ABNORMAL ML/MIN/{1.73_M2}
GLUCOSE BLD-MCNC: 94 MG/DL (ref 70–99)
HCT VFR BLD CALC: 47.8 % (ref 40.7–50.3)
HDLC SERPL-MCNC: 49 MG/DL
HEMOGLOBIN: 15.7 G/DL (ref 13–17)
IMMATURE GRANULOCYTES: 0 %
LDL CHOLESTEROL: 107 MG/DL (ref 0–130)
LYMPHOCYTES # BLD: 21 % (ref 24–43)
MCH RBC QN AUTO: 30.4 PG (ref 25.2–33.5)
MCHC RBC AUTO-ENTMCNC: 32.8 G/DL (ref 28.4–34.8)
MCV RBC AUTO: 92.5 FL (ref 82.6–102.9)
MONOCYTES # BLD: 10 % (ref 3–12)
NRBC AUTOMATED: 0 PER 100 WBC
PDW BLD-RTO: 13.4 % (ref 11.8–14.4)
PLATELET # BLD: 167 K/UL (ref 138–453)
PLATELET ESTIMATE: ABNORMAL
PMV BLD AUTO: 11.8 FL (ref 8.1–13.5)
POTASSIUM SERPL-SCNC: 4.8 MMOL/L (ref 3.7–5.3)
PROSTATE SPECIFIC ANTIGEN: 2.32 UG/L
RBC # BLD: 5.17 M/UL (ref 4.21–5.77)
RBC # BLD: ABNORMAL 10*6/UL
SEG NEUTROPHILS: 63 % (ref 36–65)
SEGMENTED NEUTROPHILS ABSOLUTE COUNT: 3.76 K/UL (ref 1.5–8.1)
SODIUM BLD-SCNC: 147 MMOL/L (ref 135–144)
THYROXINE, FREE: 1 NG/DL (ref 0.93–1.7)
TOTAL PROTEIN: 7.5 G/DL (ref 6.4–8.3)
TRIGLYCERIDE, FASTING: 113 MG/DL
TROPONIN INTERP: NORMAL
TROPONIN T: NORMAL NG/ML
TROPONIN, HIGH SENSITIVITY: 10 NG/L (ref 0–22)
TSH SERPL DL<=0.05 MIU/L-ACNC: 3.03 MIU/L (ref 0.3–5)
VLDLC SERPL CALC-MCNC: NORMAL MG/DL (ref 1–30)
WBC # BLD: 6 K/UL (ref 3.5–11.3)
WBC # BLD: ABNORMAL 10*3/UL

## 2022-02-15 PROCEDURE — 99215 OFFICE O/P EST HI 40 MIN: CPT | Performed by: NURSE PRACTITIONER

## 2022-02-15 PROCEDURE — G8427 DOCREV CUR MEDS BY ELIG CLIN: HCPCS | Performed by: NURSE PRACTITIONER

## 2022-02-15 PROCEDURE — 93000 ELECTROCARDIOGRAM COMPLETE: CPT | Performed by: NURSE PRACTITIONER

## 2022-02-15 PROCEDURE — 1036F TOBACCO NON-USER: CPT | Performed by: NURSE PRACTITIONER

## 2022-02-15 PROCEDURE — G0444 DEPRESSION SCREEN ANNUAL: HCPCS | Performed by: NURSE PRACTITIONER

## 2022-02-15 PROCEDURE — 3017F COLORECTAL CA SCREEN DOC REV: CPT | Performed by: NURSE PRACTITIONER

## 2022-02-15 PROCEDURE — G8484 FLU IMMUNIZE NO ADMIN: HCPCS | Performed by: NURSE PRACTITIONER

## 2022-02-15 PROCEDURE — G8417 CALC BMI ABV UP PARAM F/U: HCPCS | Performed by: NURSE PRACTITIONER

## 2022-02-15 RX ORDER — PANTOPRAZOLE SODIUM 20 MG/1
20 TABLET, DELAYED RELEASE ORAL DAILY
Qty: 90 TABLET | Refills: 1 | Status: SHIPPED
Start: 2022-02-15 | End: 2022-03-16 | Stop reason: SINTOL

## 2022-02-15 SDOH — ECONOMIC STABILITY: FOOD INSECURITY: WITHIN THE PAST 12 MONTHS, YOU WORRIED THAT YOUR FOOD WOULD RUN OUT BEFORE YOU GOT MONEY TO BUY MORE.: NEVER TRUE

## 2022-02-15 SDOH — ECONOMIC STABILITY: FOOD INSECURITY: WITHIN THE PAST 12 MONTHS, THE FOOD YOU BOUGHT JUST DIDN'T LAST AND YOU DIDN'T HAVE MONEY TO GET MORE.: NEVER TRUE

## 2022-02-15 ASSESSMENT — PATIENT HEALTH QUESTIONNAIRE - PHQ9
SUM OF ALL RESPONSES TO PHQ QUESTIONS 1-9: 0
1. LITTLE INTEREST OR PLEASURE IN DOING THINGS: 0
SUM OF ALL RESPONSES TO PHQ QUESTIONS 1-9: 0
SUM OF ALL RESPONSES TO PHQ9 QUESTIONS 1 & 2: 0
2. FEELING DOWN, DEPRESSED OR HOPELESS: 0

## 2022-02-15 ASSESSMENT — SOCIAL DETERMINANTS OF HEALTH (SDOH): HOW HARD IS IT FOR YOU TO PAY FOR THE VERY BASICS LIKE FOOD, HOUSING, MEDICAL CARE, AND HEATING?: NOT HARD AT ALL

## 2022-02-15 NOTE — PROGRESS NOTES
Jose Antonio Oh, MARCOS-C  [address]  [phone] [facility]. [facility] [address]  [phone]  [phone]    Elizabeth Gallegos is a 61 y.o. male presents today for Chief Complaint: Establish Care (previous patient Dr Tasia Mckenzie), Gastroesophageal Reflux, and Pain (left upper chest/shouder- comes and goes)      Patient is Accompanied by: n/a    HPI - Elizabeth Gallegos is here today for the following: Establish Care    Urology   - Naomi Chatterjee - Dr. Manoj Kapadia   - kidney stones    Epigastric/chest pain   - 3 week h/o epigastric/chest pain   - attributes to eating a lot of chocolate with mint - he reports he decreased the chocolate and the epigastric pain has subsided, but continues to have some shooting pain across the left side of his chest and does report some sob - winded easily   - reports he was awakened several times last night with acid reflux burning his throat, took Tums and that helped only somewhat    Patient Active Problem List   Diagnosis    Complete tear of left rotator cuff     Past Medical History:   Diagnosis Date    BPH (benign prostatic hypertrophy)     Calculus, kidney     Heel spur     Plantar fasciitis       Past Surgical History:   Procedure Laterality Date    CYSTOSCOPY      HEMORRHOID SURGERY      LUMBAR LAMINECTOMY  11/2014    NASAL POLYP SURGERY       No family history on file. Social History     Tobacco Use    Smoking status: Never Smoker    Smokeless tobacco: Never Used   Substance Use Topics    Alcohol use: Yes     Alcohol/week: 0.0 standard drinks     Comment: occational     ALLERGIES:    Allergies   Allergen Reactions    Amoxicillin      \"headache\"    Erythromycin Hives    Tolectin [Tolmetin] Hives          Subjective     · Constitutional:  Negative for activity change, appetite change,unexpected weight change, chills, fever, and fatigue. · HENT: Negative for ear pain, sore throat,  Rhinorrhea, sinus pain, sinus pressure, congestion.     · Eyes: Negative for pain and discharge. · Respiratory:  Negative for chest tightness, wheezing, and cough. Positive for sob  · Cardiovascular:  Negative for palpitations and leg swelling. Positive for chest pain  · Gastrointestinal: Negative for abdominal pain, blood in stool, constipation,diarrhea, nausea and vomiting. Positive for heart burn  · Endocrine: Negative for cold intolerance, heat intolerance, polydipsia, polyphagia and polyuria. · Genitourinary: Negative for difficulty urinating, dysuria, flank pain, frequency, hematuria and urgency. · Musculoskeletal: Negative for arthralgias, back pain, joint swelling, myalgias, neck pain and neck stiffness. · Skin: Negative for rash and wound. · Allergic/Immunologic: Negative for environmental allergies and food allergies. · Neurological:  Negative for dizziness, light-headedness, numbness and headaches. · Hematological:  Negative for adenopathy. Does not bruise/bleed easily. · Psychiatric/Behavioral: Negative for self-injury, sleep disturbance and suicidal ideas. Objective     PHYSICAL EXAM:   · Constitutional: Ramandeep Garsia is oriented to person, place, and time. Vital signs are normal. Appears well-developed and well-nourished. · HEENT:   · Head: Normocephalic and atraumatic. Right Ear: Hearing and external ear normal. TM normal  Canal normal  · Left Ear: Hearing and external ear normal. TM normal Canal normal  · Nose: Nares normal. Septum midline. No drainage or sinus tenderness. Mucosa pink and moist  · Mouth/Throat: Oropharynx- No erythema, no exudate. Uvula midline, no erythema, no edema. Mucous membranes are pink and moist.   · Eyes:PERRL, EOMI, Conjunctiva normal, No discharge. · Neck: Full passive range of motion. Non-tender on palpation. Neck supple. No thyromegaly present. Trachea normal.  · Cardiovascular: Normal rate, regular rhythm, S1, S2, no murmur, no gallop, no friction rub, intact distal pulses. No carotid bruit.  No lower extremity edema  · Pulmonary/Chest: Breath sounds are clear throughout, No respiratory distress, No wheezing, No chest tenderness. Effort normal  · Musculoskeletal: Extremities appear regular and symmetric. No evident masses, lesions, foreign bodies, or other abnormalities. No edema. No tenderness on palpation. Joints are stable. Full ROM, strength and tone are within normal limits. · Lymphadenopathy: No lymphadenopathy noted. · Neurological: Alert and oriented to person, place, and time. Normal motor function, Normal sensory function, No focal deficits noted. He has normal strength. · Skin: Skin is warm, dry and intact. No obvious lesions on exposed skin  · Psychiatric: Normal mood and affect. Speech is normal and behavior is normal.     Nursing note and vitals reviewed. Blood pressure 126/84, pulse 73, temperature 97 °F (36.1 °C), temperature source Temporal, height 5' 8\" (1.727 m), weight 201 lb 9.6 oz (91.4 kg), SpO2 98 %. Body mass index is 30.65 kg/m². Wt Readings from Last 3 Encounters:   02/15/22 201 lb 9.6 oz (91.4 kg)   08/29/19 195 lb (88.5 kg)   08/22/19 200 lb 6.4 oz (90.9 kg)     BP Readings from Last 3 Encounters:   02/15/22 126/84   12/19/19 128/88   08/22/19 120/84       No results found for this visit on 02/15/22. Completed Orders/Prescriptions   Orders Placed This Encounter   Medications    pantoprazole (PROTONIX) 20 MG tablet     Sig: Take 1 tablet by mouth daily     Dispense:  90 tablet     Refill:  1               AssessmentPlan/Medical Decision Making     1. Chest pain, unspecified type  - reviewed red flag symptoms and to seek immediate medical attention  - CBC With Auto Differential; Future  - Comprehensive Metabolic Panel; Future  - Troponin I; Future  - 88514 - IA ELECTROCARDIOGRAM, COMPLETE  - Stress test, lexiscan    2. Gastroesophageal reflux disease without esophagitis  - pantoprazole (PROTONIX) 20 MG tablet; Take 1 tablet by mouth daily  Dispense: 90 tablet; Refill: 1    3. Screening cholesterol level  - Lipid, Fasting; Future    4. Thyroid disorder screen  - TSH; Future  - T4, Free; Future    5. Prostate cancer screening  - PSA Screening; Future    6. SOB (shortness of breath)  - Stress test, lexiscan    7. Encounter for medical examination to establish care    8. Depression screening negative  - PHQ-9 Total Score: 0 (2/15/2022  7:43 AM)  - DE DEPRESSION SCREEN ANNUAL      Return in about 1 month (around 3/15/2022) for GERD, Chest pain. 1.  Mau received counseling on the following healthy behaviors: nutrition, exercise and medication adherence  2. Patient given educational materials - see patient instructions  3. Was a self-tracking handout given in paper form or via [employer]? No  If yes, see orders or list here. 4.  Discussed use, benefit, and side effects of prescribed medications. Barriers to medication compliance addressed. All patient questions answered. Pt voiced understanding. 5.  Reviewed prior labs, imaging, consultation, follow up, and health maintenance  6. Continue current medications, diet and exercise. 7. Discussed use, benefit, and side effects of prescribed medications. Barriers to medication compliance addressed. All her questions were answered. Pt voiced understanding. Ramandeep Garsia will continue current medications, diet and exercise. Medical Decision Making: High    Of the 35 minute duration appointment visit, Leitha Goodell, CNP spent at least 50% of the face-to-face time in counseling, explanation of diagnosis, planning of further management, and answering all questions. Signed:  Leitha Goodell, CNP    This note is created with the assistance of a speech-recognition program.  While intending to generate a document that actually reflects the content of the visit, no guarantees can be provided that every mistake has been identified and corrected by editing.

## 2022-02-15 NOTE — PATIENT INSTRUCTIONS
Patient Education        Gastroesophageal Reflux Disease (GERD): Care Instructions  Overview     Gastroesophageal reflux disease (GERD) is the backward flow of stomach acid into the esophagus. The esophagus is the tube that leads from your throat to your stomach. A one-way valve prevents the stomach acid from backing up into this tube. But when you have GERD, this valve does not close tightly enough. This can also cause pain and swelling in your esophagus. (This is called esophagitis.)  If you have mild GERD symptoms including heartburn, you may be able to control the problem with antacids or over-the-counter medicine. You can also make lifestyle changes to help reduce your symptoms. These include changing your diet and eating habits, such as not eating late at night and losing weight. Follow-up care is a key part of your treatment and safety. Be sure to make and go to all appointments, and call your doctor if you are having problems. It's also a good idea to know your test results and keep a list of the medicines you take. How can you care for yourself at home? · Take your medicines exactly as prescribed. Call your doctor if you think you are having a problem with your medicine. · Your doctor may recommend over-the-counter medicine. For mild or occasional indigestion, antacids, such as Tums, Gaviscon, Mylanta, or Maalox, may help. Your doctor also may recommend over-the-counter acid reducers, such as famotidine (Pepcid AC), cimetidine (Tagamet HB), or omeprazole (Prilosec). Read and follow all instructions on the label. If you use these medicines often, talk with your doctor. · Change your eating habits. ? It's best to eat several small meals instead of two or three large meals. ? After you eat, wait 2 to 3 hours before you lie down. ? Avoid foods that make your symptoms worse.  These may include chocolate, mint, alcohol, pepper, spicy foods, high-fat foods, or drinks with caffeine in them, such as tea, coffee, eliz, or energy drinks. If your symptoms are worse after you eat a certain food, you may want to stop eating it to see if your symptoms get better. · Do not smoke or chew tobacco. Smoking can make GERD worse. If you need help quitting, talk to your doctor about stop-smoking programs and medicines. These can increase your chances of quitting for good. · If you have GERD symptoms at night, raise the head of your bed 6 to 8 inches by putting the frame on blocks or placing a foam wedge under the head of your mattress. (Adding extra pillows does not work.)  · Do not wear tight clothing around your middle. · Lose weight if you need to. Losing just 5 to 10 pounds can help. When should you call for help? Call your doctor now or seek immediate medical care if:    · You have new or different belly pain.     · Your stools are black and tarlike or have streaks of blood. Watch closely for changes in your health, and be sure to contact your doctor if:    · Your symptoms have not improved after 2 days.     · Food seems to catch in your throat or chest.   Where can you learn more? Go to https://[employer]. org and sign in to your [employer] account. Enter I267 in the [facility] box to learn more about \"Gastroesophageal Reflux Disease (GERD): Care Instructions. \"     If you do not have an account, please click on the \"Sign Up Now\" link. Current as of: September 8, 2021               Content Version: 13.1  © 2006-2021 Healthwise, Incorporated. Care instructions adapted under license by [facility] ([facility]). If you have questions about a medical condition or this instruction, always ask your healthcare professional. [place] [address] any warranty or liability for your use of this information.

## 2022-03-16 ENCOUNTER — OFFICE VISIT (OUTPATIENT)
Dept: FAMILY MEDICINE CLINIC | Age: 67
End: 2022-03-16
Payer: COMMERCIAL

## 2022-03-16 VITALS
WEIGHT: 201 LBS | SYSTOLIC BLOOD PRESSURE: 124 MMHG | OXYGEN SATURATION: 96 % | BODY MASS INDEX: 30.56 KG/M2 | DIASTOLIC BLOOD PRESSURE: 76 MMHG | TEMPERATURE: 97.2 F | HEART RATE: 86 BPM

## 2022-03-16 DIAGNOSIS — R07.9 CHEST PAIN, UNSPECIFIED TYPE: Primary | ICD-10-CM

## 2022-03-16 DIAGNOSIS — K21.9 GASTROESOPHAGEAL REFLUX DISEASE WITHOUT ESOPHAGITIS: ICD-10-CM

## 2022-03-16 PROBLEM — M75.122 COMPLETE TEAR OF LEFT ROTATOR CUFF: Status: RESOLVED | Noted: 2019-09-08 | Resolved: 2022-03-16

## 2022-03-16 PROCEDURE — G8484 FLU IMMUNIZE NO ADMIN: HCPCS | Performed by: NURSE PRACTITIONER

## 2022-03-16 PROCEDURE — 99213 OFFICE O/P EST LOW 20 MIN: CPT | Performed by: NURSE PRACTITIONER

## 2022-03-16 PROCEDURE — 1036F TOBACCO NON-USER: CPT | Performed by: NURSE PRACTITIONER

## 2022-03-16 PROCEDURE — 3017F COLORECTAL CA SCREEN DOC REV: CPT | Performed by: NURSE PRACTITIONER

## 2022-03-16 PROCEDURE — G8427 DOCREV CUR MEDS BY ELIG CLIN: HCPCS | Performed by: NURSE PRACTITIONER

## 2022-03-16 PROCEDURE — G8417 CALC BMI ABV UP PARAM F/U: HCPCS | Performed by: NURSE PRACTITIONER

## 2022-03-16 RX ORDER — FAMOTIDINE 20 MG/1
20 TABLET, FILM COATED ORAL 2 TIMES DAILY
Qty: 60 TABLET | Refills: 3 | Status: SHIPPED
Start: 2022-03-16 | End: 2022-08-22

## 2022-03-16 ASSESSMENT — PATIENT HEALTH QUESTIONNAIRE - PHQ9
2. FEELING DOWN, DEPRESSED OR HOPELESS: 0
SUM OF ALL RESPONSES TO PHQ QUESTIONS 1-9: 0
1. LITTLE INTEREST OR PLEASURE IN DOING THINGS: 0
SUM OF ALL RESPONSES TO PHQ QUESTIONS 1-9: 0
SUM OF ALL RESPONSES TO PHQ9 QUESTIONS 1 & 2: 0

## 2022-03-16 NOTE — PROGRESS NOTES
Reginald England, MARCOS-C  [address]  [phone] [facility] [place]. Gian Bledsoe  [facility], [place] [address]  [phone]  [phone]    Lukas Lara is a 61 y.o. male presents today for Chief Complaint: Gastroesophageal Reflux (better at night, but the same during the day, states meds made it worse)      Patient is Accompanied by: n/a    HPI - Lukas Lara is here today for the following: [facility]    Urology   - Susana Sanchez - Dr. [facility]   - kidney stones    Epigastric/chest pain   - 3 week h/o epigastric/chest pain has resolved since last visit and did not do the stress test as it cost about $3000   - reports he was awakened several times last night with acid reflux burning his throat, took Tums and that helped only somewhat   - reports his symptoms worsened with protonix and has stopped and currently taking large amts of TUMS and most likely cause of elevated calcium on recent labs   - has significant h/o kidney stones    Patient Active Problem List   Diagnosis    Gastroesophageal reflux disease without esophagitis     Past Medical History:   Diagnosis Date    BPH (benign prostatic hypertrophy)     Calculus, kidney     Complete tear of left rotator cuff 9/8/2019    Heel spur     Plantar fasciitis       Past Surgical History:   Procedure Laterality Date    CYSTOSCOPY      HEMORRHOID SURGERY      LUMBAR LAMINECTOMY  11/2014    NASAL POLYP SURGERY       No family history on file. Social History     Tobacco Use    Smoking status: Never Smoker    Smokeless tobacco: Never Used   Substance Use Topics    Alcohol use: Yes     Alcohol/week: 0.0 standard drinks     Comment: occational     ALLERGIES:    Allergies   Allergen Reactions    Amoxicillin      \"headache\"    Erythromycin Hives    Tolectin [Tolmetin] Hives          Subjective     · Constitutional:  Negative for activity change, appetite change,unexpected weight change, chills, fever, and fatigue.     · HENT: Negative for ear pain, sore throat,  Rhinorrhea, sinus pain, sinus pressure, congestion. · Eyes:  Negative for pain and discharge. · Respiratory:  Negative for chest tightness, wheezing, sob and cough. · Cardiovascular:  Negative for palpitations and leg swelling  · Gastrointestinal: Negative for abdominal pain, blood in stool, constipation,diarrhea, nausea and vomiting. Positive for heart burn  · Endocrine: Negative for cold intolerance, heat intolerance, polydipsia, polyphagia and polyuria. · Genitourinary: Negative for difficulty urinating, dysuria, flank pain, frequency, hematuria and urgency. · Musculoskeletal: Negative for arthralgias, back pain, joint swelling, myalgias, neck pain and neck stiffness. · Skin: Negative for rash and wound. · Allergic/Immunologic: Negative for environmental allergies and food allergies. · Neurological:  Negative for dizziness, light-headedness, numbness and headaches. · Hematological:  Negative for adenopathy. Does not bruise/bleed easily. · Psychiatric/Behavioral: Negative for self-injury, sleep disturbance and suicidal ideas. Objective     PHYSICAL EXAM:   · Constitutional: Jaycee Forde is oriented to person, place, and time. Vital signs are normal. Appears well-developed and well-nourished. · HEENT:   · Head: Normocephalic and atraumatic. Cardiovascular: Normal rate, regular rhythm, S1, S2, no murmur, no gallop, no friction rub, intact distal pulses. No carotid bruit. No lower extremity edema  · Pulmonary/Chest: Breath sounds are clear throughout, No respiratory distress, No wheezing, No chest tenderness. Effort normal  · Neurological: Alert and oriented to person, place, and time. Normal motor function, Normal sensory function, No focal deficits noted. He has normal strength. · Skin: Skin is warm, dry and intact. No obvious lesions on exposed skin  · Psychiatric: Normal mood and affect. Speech is normal and behavior is normal.     Nursing note and vitals reviewed.   Blood pressure 124/76, pulse 86, temperature 97.2 °F (36.2 °C), temperature source Temporal, weight 201 lb (91.2 kg), SpO2 96 %. Body mass index is 30.56 kg/m². Wt Readings from Last 3 Encounters:   03/16/22 201 lb (91.2 kg)   02/15/22 201 lb 9.6 oz (91.4 kg)   08/29/19 195 lb (88.5 kg)     BP Readings from Last 3 Encounters:   03/16/22 124/76   02/15/22 126/84   12/19/19 128/88       No results found for this visit on 03/16/22. Completed Orders/Prescriptions   Orders Placed This Encounter   Medications    famotidine (PEPCID) 20 MG tablet     Sig: Take 1 tablet by mouth 2 times daily     Dispense:  60 tablet     Refill:  3               AssessmentPlan/Medical Decision Making     1. Chest pain, unspecified type  - resolved  - if this returns, he will need to have the stress test completed  - DDx: GERD    2. Gastroesophageal reflux disease without esophagitis  - stop tums d/t increased calcium and h/o kidney stones  - famotidine (PEPCID) 20 MG tablet; Take 1 tablet by mouth 2 times daily  Dispense: 60 tablet; Refill: 3      Return in about 6 months (around 9/16/2022), or if symptoms worsen or fail to improve. 1.  Mau received counseling on the following healthy behaviors: nutrition, exercise and medication adherence  2. Patient given educational materials - see patient instructions  3. Was a self-tracking handout given in paper form or via [employer]? No  If yes, see orders or list here. 4.  Discussed use, benefit, and side effects of prescribed medications. Barriers to medication compliance addressed. All patient questions answered. Pt voiced understanding. 5.  Reviewed prior labs, imaging, consultation, follow up, and health maintenance  6. Continue current medications, diet and exercise. 7. Discussed use, benefit, and side effects of prescribed medications. Barriers to medication compliance addressed. All her questions were answered. Pt voiced understanding.  Mercy Fofana will continue current medications, diet and exercise. Medical Decision Making: Low    Of the 25 minute duration appointment visit, Alayna Gomez CNP spent at least 50% of the face-to-face time in counseling, explanation of diagnosis, planning of further management, and answering all questions. Signed:  Alayna Gomez CNP    This note is created with the assistance of a speech-recognition program.  While intending to generate a document that actually reflects the content of the visit, no guarantees can be provided that every mistake has been identified and corrected by editing.

## 2022-08-17 ENCOUNTER — TELEPHONE (OUTPATIENT)
Dept: FAMILY MEDICINE CLINIC | Age: 67
End: 2022-08-17

## 2022-08-17 DIAGNOSIS — Z12.11 COLON CANCER SCREENING: Primary | ICD-10-CM

## 2022-08-17 NOTE — TELEPHONE ENCOUNTER
Patient scheduled for follow up appointment next will but agrees to utilize the Cologuard if mailed to him. Order placed.

## 2022-08-22 ENCOUNTER — OFFICE VISIT (OUTPATIENT)
Dept: FAMILY MEDICINE CLINIC | Age: 67
End: 2022-08-22
Payer: MEDICARE

## 2022-08-22 VITALS
SYSTOLIC BLOOD PRESSURE: 118 MMHG | DIASTOLIC BLOOD PRESSURE: 76 MMHG | TEMPERATURE: 97.1 F | WEIGHT: 200 LBS | OXYGEN SATURATION: 96 % | BODY MASS INDEX: 30.41 KG/M2 | HEART RATE: 68 BPM

## 2022-08-22 DIAGNOSIS — K21.9 GASTROESOPHAGEAL REFLUX DISEASE WITHOUT ESOPHAGITIS: Primary | ICD-10-CM

## 2022-08-22 DIAGNOSIS — R06.02 SOB (SHORTNESS OF BREATH): ICD-10-CM

## 2022-08-22 DIAGNOSIS — R07.9 CHEST PAIN, UNSPECIFIED TYPE: ICD-10-CM

## 2022-08-22 PROCEDURE — 1123F ACP DISCUSS/DSCN MKR DOCD: CPT | Performed by: NURSE PRACTITIONER

## 2022-08-22 PROCEDURE — 99214 OFFICE O/P EST MOD 30 MIN: CPT | Performed by: NURSE PRACTITIONER

## 2022-08-22 PROCEDURE — G8417 CALC BMI ABV UP PARAM F/U: HCPCS | Performed by: NURSE PRACTITIONER

## 2022-08-22 PROCEDURE — 1036F TOBACCO NON-USER: CPT | Performed by: NURSE PRACTITIONER

## 2022-08-22 PROCEDURE — 3017F COLORECTAL CA SCREEN DOC REV: CPT | Performed by: NURSE PRACTITIONER

## 2022-08-22 PROCEDURE — G8427 DOCREV CUR MEDS BY ELIG CLIN: HCPCS | Performed by: NURSE PRACTITIONER

## 2022-08-22 RX ORDER — PANTOPRAZOLE SODIUM 20 MG/1
20 TABLET, DELAYED RELEASE ORAL DAILY
Qty: 30 TABLET | Refills: 1 | Status: SHIPPED | OUTPATIENT
Start: 2022-08-22

## 2022-08-22 ASSESSMENT — PATIENT HEALTH QUESTIONNAIRE - PHQ9
SUM OF ALL RESPONSES TO PHQ QUESTIONS 1-9: 0
SUM OF ALL RESPONSES TO PHQ9 QUESTIONS 1 & 2: 0
2. FEELING DOWN, DEPRESSED OR HOPELESS: 0
SUM OF ALL RESPONSES TO PHQ QUESTIONS 1-9: 0
1. LITTLE INTEREST OR PLEASURE IN DOING THINGS: 0

## 2022-08-22 NOTE — PROGRESS NOTES
MARCOS Nagel-PARISH  [address]  [phone] [facility]. [place]  [address]  [phone]  [phone]    Oliverio Cates is a 74 y.o. male presents today for Chief Complaint: Gastroesophageal Reflux and Shortness of Breath (While playing sports)      Patient is Accompanied by: n/a    \[facility]\"" - Oliverio Cates is here today for the following: [facility]    Urology   - Nadege Lerma - Dr. Amaya Mccoy   - kidney stones    Epigastric/chest pain   - 3 week h/o epigastric/chest pain has resolved since last visit and did not do the stress test as it cost about $3000   - reports he was awakened several times last night with acid reflux burning his throat, took Tums and that helped only somewhat   - reports his symptoms worsened with protonix and has stopped and currently taking large amts of TUMS and most likely cause of elevated calcium on recent labs   - has significant h/o kidney stones  Update 08/22/2022   - stopped taking Pepcid as he feels his pain is worse with this   - describes as an aching pain   - reports he is playing softball and becomes very short of breath when running after a ball   - denies wheezing and cough - just short of breath   - continues to take Tums (had [place] food and a Lucio Lawn) and this helped, stopped drinking a lot of pop    Patient Active Problem List   Diagnosis    Gastroesophageal reflux disease without esophagitis     Past Medical History:   Diagnosis Date    BPH (benign prostatic hypertrophy)     Calculus, kidney     Complete tear of left rotator cuff 9/8/2019    Heel spur     Plantar fasciitis       Past Surgical History:   Procedure Laterality Date    CYSTOSCOPY      HEMORRHOID SURGERY      LUMBAR LAMINECTOMY  11/2014    NASAL POLYP SURGERY       No family history on file. Social History     Tobacco Use    Smoking status: Never    Smokeless tobacco: Never   Substance Use Topics    Alcohol use:  Yes     Alcohol/week: 0.0 standard drinks     Comment: are clear throughout, No respiratory distress, No wheezing, No chest tenderness. Effort normal  Musculoskeletal: Extremities appear regular and symmetric. No evident masses, lesions, foreign bodies, or other abnormalities. No edema. No tenderness on palpation. Joints are stable. Full ROM, strength and tone are within normal limits. Neurological: Alert and oriented to person, place, and time. Normal motor function, Normal sensory function, No focal deficits noted. He has normal strength. Skin: Skin is warm, dry and intact. No obvious lesions on exposed skin  Psychiatric: Normal mood and affect. Speech is normal and behavior is normal.       Nursing note and vitals reviewed. Blood pressure 118/76, pulse 68, temperature 97.1 °F (36.2 °C), temperature source Temporal, weight 200 lb (90.7 kg), SpO2 96 %. Body mass index is 30.41 kg/m². Wt Readings from Last 3 Encounters:   08/22/22 200 lb (90.7 kg)   03/16/22 201 lb (91.2 kg)   02/15/22 201 lb 9.6 oz (91.4 kg)     BP Readings from Last 3 Encounters:   08/22/22 118/76   03/16/22 124/76   02/15/22 126/84       No results found for this visit on 08/22/22. AssessmentPlan/Medical Decision Making     1. Gastroesophageal reflux disease without esophagitis  - handout provided for diet and other lifestyle modifications  - [facility] - Son Kline MD, Gastroenterology, [place]  - pantoprazole (PROTONIX) 20 MG tablet; Take 1 tablet by mouth daily  Dispense: 30 tablet; Refill: 1    2. Chest pain, unspecified type  - reviewed red flag symptoms  - Stress test, myoview; Future    3. SOB (shortness of breath)  - cardiac vs pulmonary  - if cardiac clear will need to consider PFT  - Stress test, myoview; Future    Return in about 1 month (around 9/22/2022), or if symptoms worsen or fail to improve. 1.  Mau received counseling on the following healthy behaviors: nutrition, exercise and medication adherence  2.   Patient given educational materials - see patient instructions  3. Was a self-tracking handout given in paper form or via [employer]? No  If yes, see orders or list here. 4.  Discussed use, benefit, and side effects of prescribed medications. Barriers to medication compliance addressed. All patient questions answered. Pt voiced understanding. 5.  Reviewed prior labs, imaging, consultation, follow up, and health maintenance  6. Continue current medications, diet and exercise. 7. Discussed use, benefit, and side effects of prescribed medications. Barriers to medication compliance addressed. All her questions were answered. Pt voiced understanding. Citlaly Peoples will continue current medications, diet and exercise. Medical Decision Making: Moderate    Of the 25 minute duration appointment visit, Sofiya Chase CNP spent at least 50% of the face-to-face time in counseling, explanation of diagnosis, planning of further management, and answering all questions. Signed:  Sofiya Chase CNP    This note is created with the assistance of a speech-recognition program.  While intending to generate a document that actually reflects the content of the visit, no guarantees can be provided that every mistake has been identified and corrected by editing.

## 2022-09-04 NOTE — PROGRESS NOTES
[facility]  [address] [place] [address]  Dept: [phone]    Kasi Dai is a 66 y.o. male who presents today for his medical conditions/complaintsas noted below. Kasi Dai is here today c/o Shoulder Pain (LEFT Lukas Roldan)        Past Medical History:   Diagnosis Date    BPH (benign prostatic hypertrophy)     Calculus, kidney     Heel spur     Plantar fasciitis       Past Surgical History:   Procedure Laterality Date    CYSTOSCOPY      HEMORRHOID SURGERY      NASAL POLYP SURGERY         No family history on file. Social History     Tobacco Use    Smoking status: Never Smoker    Smokeless tobacco: Never Used   Substance Use Topics    Alcohol use: Yes     Alcohol/week: 0.0 oz     Comment: occational      Current Outpatient Medications   Medication Sig Dispense Refill    finasteride (PROSCAR) 5 MG tablet Take 5 mg by mouth      alfuzosin (UROXATRAL) 10 MG extended release tablet       halcinonide (HALOG) 0.1 % CREA Apply topically daily Indications: use as directed       No current facility-administered medications for this visit. Allergies   Allergen Reactions    Amoxicillin      \"headache\"    Erythromycin Hives    Tolectin [Tolmetin] Hives         HPI:     Shoulder Pain    The pain is present in the left shoulder. This is a new problem. The current episode started 1 to 4 weeks ago (2 weeks). There has been a history of trauma (diving for a ball playing softball, then swung the bat and reinjured shoulder). The problem occurs constantly. The problem has been waxing and waning. The quality of the pain is described as aching. The pain is at a severity of 5/10. Pertinent negatives include no fever, inability to bear weight, itching, joint locking, joint swelling, limited range of motion, numbness, stiffness or tingling. Associated symptoms comments: Pain does not radiate .  Exacerbated by: night time is the worst symptoms , any type of sudden movement aggravates symptoms. He has tried acetaminophen (icy hot) for the symptoms. The treatment provided moderate relief. Never completed FIT test, wants to discussed colon cancer screenings     Also never heard regarding sleep study results     Health Maintenance:      Subjective:     Review of Systems   Constitutional: Negative. Negative for appetite change, chills and fever. HENT: Negative. Eyes: Negative. Respiratory: Negative. Cardiovascular: Negative. Gastrointestinal: Negative. Endocrine: Negative. Genitourinary: Negative. Musculoskeletal: Positive for arthralgias. Negative for gait problem, joint swelling and stiffness. Skin: Negative. Negative for color change, itching, pallor, rash and wound. Allergic/Immunologic: Negative. Neurological: Positive for weakness. Negative for tingling and numbness. Hematological: Negative. Objective:     Vitals:    05/20/19 1615   BP: 118/70   Pulse: 76   Resp: 18   SpO2: 97%       Body mass index is 31.17 kg/m². Physical Exam   Constitutional: He is oriented to person, place, and time. He appears well-developed and well-nourished. No distress. HENT:   Head: Normocephalic and atraumatic. Right Ear: External ear normal.   Left Ear: External ear normal.   Nose: Nose normal.   Mouth/Throat: Oropharynx is clear and moist.   Eyes: Pupils are equal, round, and reactive to light. Conjunctivae are normal. Right eye exhibits no discharge. Left eye exhibits no discharge. No scleral icterus. Neck: Normal range of motion. Neck supple. No tracheal deviation present. Cardiovascular: Normal rate, regular rhythm, normal heart sounds and intact distal pulses. Exam reveals no gallop and no friction rub. No murmur heard. Pulses:       Radial pulses are 2+ on the right side, and 2+ on the left side. Pulmonary/Chest: Effort normal and breath sounds normal. No accessory muscle usage or stridor. No tachypnea. No respiratory distress.  He has maintenance. Instructed to continue current medications, diet and exercise. Patient agreedwith treatment plan. Follow up as directed.      Electronically signed by RILEY Vallejo CNP on 5/20/2019 (E4) spontaneous

## 2022-09-06 LAB — NONINV COLON CA DNA+OCC BLD SCRN STL QL: NEGATIVE

## 2022-09-19 ENCOUNTER — OFFICE VISIT (OUTPATIENT)
Dept: FAMILY MEDICINE CLINIC | Age: 67
End: 2022-09-19
Payer: MEDICARE

## 2022-09-19 VITALS
SYSTOLIC BLOOD PRESSURE: 122 MMHG | WEIGHT: 200.2 LBS | HEIGHT: 68 IN | DIASTOLIC BLOOD PRESSURE: 84 MMHG | BODY MASS INDEX: 30.34 KG/M2

## 2022-09-19 DIAGNOSIS — Z00.00 INITIAL MEDICARE ANNUAL WELLNESS VISIT: Primary | ICD-10-CM

## 2022-09-19 DIAGNOSIS — Z00.00 WELCOME TO MEDICARE PREVENTIVE VISIT: ICD-10-CM

## 2022-09-19 PROCEDURE — G0402 INITIAL PREVENTIVE EXAM: HCPCS | Performed by: FAMILY MEDICINE

## 2022-09-19 PROCEDURE — 1123F ACP DISCUSS/DSCN MKR DOCD: CPT | Performed by: FAMILY MEDICINE

## 2022-09-19 PROCEDURE — 3017F COLORECTAL CA SCREEN DOC REV: CPT | Performed by: FAMILY MEDICINE

## 2022-09-19 ASSESSMENT — PATIENT HEALTH QUESTIONNAIRE - PHQ9
SUM OF ALL RESPONSES TO PHQ QUESTIONS 1-9: 0
SUM OF ALL RESPONSES TO PHQ QUESTIONS 1-9: 0
2. FEELING DOWN, DEPRESSED OR HOPELESS: 0
1. LITTLE INTEREST OR PLEASURE IN DOING THINGS: 0
SUM OF ALL RESPONSES TO PHQ QUESTIONS 1-9: 0
SUM OF ALL RESPONSES TO PHQ9 QUESTIONS 1 & 2: 0
SUM OF ALL RESPONSES TO PHQ QUESTIONS 1-9: 0

## 2022-09-19 ASSESSMENT — LIFESTYLE VARIABLES
HOW OFTEN DO YOU HAVE A DRINK CONTAINING ALCOHOL: MONTHLY OR LESS
HOW MANY STANDARD DRINKS CONTAINING ALCOHOL DO YOU HAVE ON A TYPICAL DAY: 1 OR 2

## 2022-09-19 NOTE — PROGRESS NOTES
Medicare Annual Wellness Visit    Lico Toro is here for Medicare AWV    Assessment & Plan   Initial Medicare annual wellness visit    Recommendations for Preventive Services Due: see orders and patient instructions/AVS.  Recommended screening schedule for the next 5-10 years is provided to the patient in written form: see Patient Instructions/AVS.     No follow-ups on file. Subjective       Patient's complete Health Risk Assessment and screening values have been reviewed and are found in Flowsheets. The following problems were reviewed today and where indicated follow up appointments were made and/or referrals ordered. Positive Risk Factor Screenings with Interventions:             General Health and ACP:  General  In general, how would you say your health is?: Very Good  In the past 7 days, have you experienced any of the following: New or Increased Pain, New or Increased Fatigue, Loneliness, Social Isolation, Stress or Anger?: No  Do you get the social and emotional support that you need?: Yes  Do you have a Living Will?: (!) No (education provided on services, would like to think about it at this time)    Advance Directives       Power of [address] [place] [address] Will ACP-Advance Directive ACP-Power of     Not on File Not on File Not on File Not on File          General Health Risk Interventions:  No Living Will: Patient declines ACP discussion/assistance--education provided on ACP services, pt would like to discuss with his family first. Pt advised he can request referral at any time.     Health Habits/Nutrition:  Physical Activity: Insufficiently Active    Days of Exercise per Week: 2 days    Minutes of Exercise per Session: 20 min     Have you lost any weight without trying in the past 3 months?: No  Body mass index: (!) 30.44  Have you seen the dentist within the past year?: Yes  Health Habits/Nutrition Interventions:  Inadequate physical activity:  educational materials provided to promote increased physical activity--encouraged to increase number of days/week he walks. Hearing/Vision:  Do you or your family notice any trouble with your hearing that hasn't been managed with hearing aids?: (!) Yes (has had hearing checked before, c/o trouble when background noise is present)  Do you have difficulty driving, watching TV, or doing any of your daily activities because of your eyesight?: No  Have you had an eye exam within the past year?: Yes  No results found. Hearing/Vision Interventions:  Hearing concerns:  Pt states he has had hearing checked before. 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 Pt denies wanting further evaluation at this time. Safety:  Do you have working smoke detectors?: Yes  Do you have any tripping hazards - loose or unsecured carpets or rugs?: No  Do you have any tripping hazards - clutter in doorways, halls, or stairs?: No  Do you have either shower bars, grab bars, non-slip mats or non-slip surfaces in your shower or bathtub?: (!) No (education provided on safety)  Do all of your stairways have a railing or banister?: (!) No (no railing down to basement)  Do you always fasten your seatbelt when you are in a car?: Yes  Safety Interventions:  Home safety tips provided           Objective   Vitals:    09/19/22 0858   BP: 122/84   Site: Right Upper Arm   Position: Sitting   Cuff Size: Large Adult   Weight: 200 lb 3.2 oz (90.8 kg)   Height: 5' 8\" (1.727 m)      Body mass index is 30.44 kg/m². Allergies   Allergen Reactions    Amoxicillin      \"headache\"    Erythromycin Hives    Tolectin [Tolmetin] Hives     Prior to Visit Medications    Medication Sig Taking?  Authorizing Provider   pantoprazole (PROTONIX) 20 MG tablet Take 1 tablet by mouth daily Yes RILEY Hand - CNP   finasteride (PROSCAR) 5 MG tablet Take 5 mg by mouth daily  Yes Historical Provider, MD   alfuzosin (UROXATRAL) 10 MG extended release tablet Take 10 mg by mouth daily  Yes Historical Provider, MD Awad (Including outside providers/suppliers regularly involved in providing care):   Patient Care Team:  RILEY Gutierrez CNP as PCP - General (Nurse Practitioner)  RILEY Gutierrez CNP as PCP - [facility] Empaneled Provider  Sarbjit Hamlin as Consulting Physician (Urology)     Reviewed and updated this visit:  Allergies  Meds             IBridgett RN, 9/19/2022, performed the documented evaluation under the direct supervision of the attending physician.

## 2022-09-19 NOTE — PROGRESS NOTES
Medicare Annual Wellness Visit    Phillip Freed is here for Medicare AWV    Assessment & Plan   Initial Medicare annual wellness visit  Welcome to Medicare preventive visit    Recommendations for Preventive Services Due: see orders and patient instructions/AVS.  Recommended screening schedule for the next 5-10 years is provided to the patient in written form: see Patient Instructions/AVS.     Return for Medicare Annual Wellness Visit in 1 year. Subjective       Patient's complete Health Risk Assessment and screening values have been reviewed and are found in Flowsheets. The following problems were reviewed today and where indicated follow up appointments were made and/or referrals ordered.     Positive Risk Factor Screenings with Interventions:             General Health and ACP:  General  In general, how would you say your health is?: Very Good  In the past 7 days, have you experienced any of the following: New or Increased Pain, New or Increased Fatigue, Loneliness, Social Isolation, Stress or Anger?: No  Do you get the social and emotional support that you need?: Yes  Do you have a Living Will?: (!) No (education provided on services, would like to think about it at this time)    Advance Directives       Power of [address] Will ACP-Advance Directive ACP-Power of     Not on File Not on File Not on File Not on File          General Health Risk Interventions:  {Medicare AWV General Health Risk Interventions:897228309}    Health Habits/Nutrition:  Physical Activity: Insufficiently Active    Days of Exercise per Week: 2 days    Minutes of Exercise per Session: 20 min     Have you lost any weight without trying in the past 3 months?: No  Body mass index: (!) 30.44  Have you seen the dentist within the past year?: Yes  Health Habits/Nutrition Interventions:  {Medicare AWV Health Habits/Nutrition Interventions:651767420}    Hearing/Vision:  Do you or your family notice any trouble with your hearing that hasn't been managed with hearing aids?: (!) Yes (has had hearing checked before, c/o trouble when background noise is present)  Do you have difficulty driving, watching TV, or doing any of your daily activities because of your eyesight?: No  Have you had an eye exam within the past year?: Yes  No results found. Hearing/Vision Interventions:  {Medicare AWV Hearing/Vision Interventions:569264244}    Safety:  Do you have working smoke detectors?: Yes  Do you have any tripping hazards - loose or unsecured carpets or rugs?: No  Do you have any tripping hazards - clutter in doorways, halls, or stairs?: No  Do you have either shower bars, grab bars, non-slip mats or non-slip surfaces in your shower or bathtub?: (!) No (education provided on safety)  Do all of your stairways have a railing or banister?: (!) No (no railing down to basement)  Do you always fasten your seatbelt when you are in a car?: Yes  Safety Interventions:  {Medicare AWV Safety Interventions:266728173}           Objective   Vitals:    09/19/22 0858   BP: 122/84   Site: Right Upper Arm   Position: Sitting   Cuff Size: Large Adult   Weight: 200 lb 3.2 oz (90.8 kg)   Height: 5' 8\" (1.727 m)      Body mass index is 30.44 kg/m². {OPTIONAL - GENERAL PHYSICAL EXAM (WILL AUTO-DELETE IF NOT FISC):749341637}       Allergies   Allergen Reactions    Amoxicillin      \"headache\"    Erythromycin Hives    Tolectin [Tolmetin] Hives     Prior to Visit Medications    Medication Sig Taking?  Authorizing Provider   pantoprazole (PROTONIX) 20 MG tablet Take 1 tablet by mouth daily Yes RILEY Hand CNP   finasteride (PROSCAR) 5 MG tablet Take 5 mg by mouth daily  Yes Historical Provider, MD   alfuzosin (UROXATRAL) 10 MG extended release tablet Take 10 mg by mouth daily  Yes Historical Provider, MD Awad (Including outside providers/suppliers regularly involved in providing care):   Patient Care Team:  RILEY Childers CNP as PCP - General (Nurse Practitioner)  RILEY Dioxn CNP as PCP - [facility] Empaneled Provider  Jose Lambert as Consulting Physician (Urology)     Reviewed and updated this visit:  Allergies  Meds

## 2022-09-19 NOTE — PATIENT INSTRUCTIONS
Personalized Preventive Plan for Divina Riggins - 9/19/2022  Medicare offers a range of preventive health benefits. Some of the tests and screenings are paid in full while other may be subject to a deductible, co-insurance, and/or copay. Some of these benefits include a comprehensive review of your medical history including lifestyle, illnesses that may run in your family, and various assessments and screenings as appropriate. After reviewing your medical record and screening and assessments performed today your provider may have ordered immunizations, labs, imaging, and/or referrals for you. A list of these orders (if applicable) as well as your Preventive Care list are included within your After Visit Summary for your review. Other Preventive Recommendations:    A preventive eye exam performed by an eye specialist is recommended every 1-2 years to screen for glaucoma; cataracts, macular degeneration, and other eye disorders. A preventive dental visit is recommended every 6 months. Try to get at least 150 minutes of exercise per week or 10,000 steps per day on a pedometer . Order or download the FREE \"Exercise & Physical Activity: Your Everyday Guide\" from [employer]. Call 4-424.666.3878 or search [employer] online. You need 0268-8855 mg of calcium and 5854-7294 IU of vitamin D per day. It is possible to meet your calcium requirement with diet alone, but a vitamin D supplement is usually necessary to meet this goal.  When exposed to the sun, use a sunscreen that protects against both UVA and UVB radiation with an SPF of 30 or greater. Reapply every 2 to 3 hours or after sweating, drying off with a towel, or swimming. Always wear a seat belt when traveling in a car. Always wear a helmet when riding a bicycle or motorcycle. Personalized Preventive Plan for Divina Riggins - 9/19/2022  Medicare offers a range of preventive health benefits.  Some of the tests and screenings are paid in full while other may be subject to a deductible, co-insurance, and/or copay. Some of these benefits include a comprehensive review of your medical history including lifestyle, illnesses that may run in your family, and various assessments and screenings as appropriate. After reviewing your medical record and screening and assessments performed today your provider may have ordered immunizations, labs, imaging, and/or referrals for you. A list of these orders (if applicable) as well as your Preventive Care list are included within your After Visit Summary for your review. Other Preventive Recommendations:    A preventive eye exam performed by an eye specialist is recommended every 1-2 years to screen for glaucoma; cataracts, macular degeneration, and other eye disorders. A preventive dental visit is recommended every 6 months. Try to get at least 150 minutes of exercise per week or 10,000 steps per day on a pedometer . Order or download the FREE \"Exercise & Physical Activity: Your Everyday Guide\" from [employer]. Call 9-377.866.5961 or search [employer] online. You need 6886-6082 mg of calcium and 6140-0995 IU of vitamin D per day. It is possible to meet your calcium requirement with diet alone, but a vitamin D supplement is usually necessary to meet this goal.  When exposed to the sun, use a sunscreen that protects against both UVA and UVB radiation with an SPF of 30 or greater. Reapply every 2 to 3 hours or after sweating, drying off with a towel, or swimming. Always wear a seat belt when traveling in a car. Always wear a helmet when riding a bicycle or motorcycle.

## 2023-02-14 ENCOUNTER — OFFICE VISIT (OUTPATIENT)
Dept: FAMILY MEDICINE CLINIC | Age: 68
End: 2023-02-14

## 2023-02-14 ENCOUNTER — HOSPITAL ENCOUNTER (OUTPATIENT)
Age: 68
Setting detail: SPECIMEN
Discharge: HOME | End: 2023-02-14

## 2023-02-14 ENCOUNTER — HOSPITAL ENCOUNTER (OUTPATIENT)
Dept: CT IMAGING | Facility: CLINIC | Age: 68
Discharge: HOME | End: 2023-02-16
Payer: MEDICARE

## 2023-02-14 VITALS
OXYGEN SATURATION: 95 % | HEART RATE: 75 BPM | TEMPERATURE: 98.3 F | DIASTOLIC BLOOD PRESSURE: 78 MMHG | WEIGHT: 200 LBS | BODY MASS INDEX: 30.41 KG/M2 | SYSTOLIC BLOOD PRESSURE: 112 MMHG

## 2023-02-14 DIAGNOSIS — R10.32 LEFT LOWER QUADRANT ABDOMINAL PAIN: ICD-10-CM

## 2023-02-14 DIAGNOSIS — R10.32 LEFT LOWER QUADRANT ABDOMINAL PAIN: Primary | ICD-10-CM

## 2023-02-14 LAB
ABSOLUTE EOS #: 0.11 K/UL (ref 0–0.44)
ABSOLUTE IMMATURE GRANULOCYTE: <0.03 K/UL (ref 0–0.3)
ABSOLUTE LYMPH #: 1.18 K/UL (ref 1.1–3.7)
ABSOLUTE MONO #: 0.57 K/UL (ref 0.1–1.2)
ALBUMIN SERPL-MCNC: 4.5 G/DL (ref 3.5–5.2)
ALBUMIN/GLOBULIN RATIO: 1.6 (ref 1–2.5)
ALP SERPL-CCNC: 90 U/L (ref 40–129)
ALT SERPL-CCNC: 24 U/L (ref 5–41)
ANION GAP SERPL CALCULATED.3IONS-SCNC: 12 MMOL/L (ref 9–17)
AST SERPL-CCNC: 27 U/L
BASOPHILS # BLD: 1 % (ref 0–2)
BASOPHILS ABSOLUTE: 0.03 K/UL (ref 0–0.2)
BILIRUB SERPL-MCNC: 0.6 MG/DL (ref 0.3–1.2)
BILIRUBIN URINE: NEGATIVE
BUN SERPL-MCNC: 16 MG/DL (ref 8–23)
CALCIUM SERPL-MCNC: 9.5 MG/DL (ref 8.6–10.4)
CHLORIDE SERPL-SCNC: 104 MMOL/L (ref 98–107)
CO2 SERPL-SCNC: 25 MMOL/L (ref 20–31)
COLOR: YELLOW
COMMENT UA: NORMAL
CREAT SERPL-MCNC: 1.01 MG/DL (ref 0.7–1.2)
EOSINOPHILS RELATIVE PERCENT: 2 % (ref 1–4)
GFR SERPL CREATININE-BSD FRML MDRD: >60 ML/MIN/1.73M2
GLUCOSE SERPL-MCNC: 93 MG/DL (ref 70–99)
GLUCOSE UR STRIP.AUTO-MCNC: NEGATIVE MG/DL
HCT VFR BLD AUTO: 46.1 % (ref 40.7–50.3)
HGB BLD-MCNC: 15.6 G/DL (ref 13–17)
IMMATURE GRANULOCYTES: 0 %
KETONES UR STRIP.AUTO-MCNC: NEGATIVE MG/DL
LEUKOCYTE ESTERASE UR QL STRIP.AUTO: NEGATIVE
LIPASE SERPL-CCNC: 41 U/L (ref 13–60)
LYMPHOCYTES # BLD: 18 % (ref 24–43)
MCH RBC QN AUTO: 30.5 PG (ref 25.2–33.5)
MCHC RBC AUTO-ENTMCNC: 33.8 G/DL (ref 28.4–34.8)
MCV RBC AUTO: 90 FL (ref 82.6–102.9)
MONOCYTES # BLD: 9 % (ref 3–12)
NITRITE UR QL STRIP.AUTO: NEGATIVE
NRBC AUTOMATED: 0 PER 100 WBC
PDW BLD-RTO: 12.9 % (ref 11.8–14.4)
PLATELET # BLD AUTO: 200 K/UL (ref 138–453)
PMV BLD AUTO: 11.3 FL (ref 8.1–13.5)
POTASSIUM SERPL-SCNC: 4.8 MMOL/L (ref 3.7–5.3)
PROT SERPL-MCNC: 7.3 G/DL (ref 6.4–8.3)
PROT UR STRIP.AUTO-MCNC: 6.5 MG/DL (ref 5–8)
PROT UR STRIP.AUTO-MCNC: NEGATIVE MG/DL
RBC # BLD: 5.12 M/UL (ref 4.21–5.77)
SEG NEUTROPHILS: 70 % (ref 36–65)
SEGMENTED NEUTROPHILS ABSOLUTE COUNT: 4.64 K/UL (ref 1.5–8.1)
SODIUM SERPL-SCNC: 141 MMOL/L (ref 135–144)
SPECIFIC GRAVITY UA: 1.02 (ref 1–1.03)
TURBIDITY: CLEAR
URINE HGB: NEGATIVE
UROBILINOGEN, URINE: NORMAL
WBC # BLD AUTO: 6.6 K/UL (ref 3.5–11.3)

## 2023-02-14 PROCEDURE — 2580000003 HC RX 258: Performed by: NURSE PRACTITIONER

## 2023-02-14 PROCEDURE — 74177 CT ABD & PELVIS W/CONTRAST: CPT

## 2023-02-14 PROCEDURE — 6360000004 HC RX CONTRAST MEDICATION: Performed by: NURSE PRACTITIONER

## 2023-02-14 PROCEDURE — 2500000003 HC RX 250 WO HCPCS: Performed by: NURSE PRACTITIONER

## 2023-02-14 RX ORDER — 0.9 % SODIUM CHLORIDE 0.9 %
70 INTRAVENOUS SOLUTION INTRAVENOUS ONCE
Status: COMPLETED | OUTPATIENT
Start: 2023-02-14 | End: 2023-02-14

## 2023-02-14 RX ORDER — SODIUM CHLORIDE 0.9 % (FLUSH) 0.9 %
10 SYRINGE (ML) INJECTION PRN
Status: DISCONTINUED | OUTPATIENT
Start: 2023-02-14 | End: 2023-02-17 | Stop reason: HOSPADM

## 2023-02-14 RX ADMIN — SODIUM CHLORIDE 70 ML: 9 INJECTION, SOLUTION INTRAVENOUS at 16:40

## 2023-02-14 RX ADMIN — IOPAMIDOL 75 ML: 755 INJECTION, SOLUTION INTRAVENOUS at 16:38

## 2023-02-14 RX ADMIN — SODIUM CHLORIDE, PRESERVATIVE FREE 10 ML: 5 INJECTION INTRAVENOUS at 16:40

## 2023-02-14 RX ADMIN — BARIUM SULFATE 450 ML: 20 SUSPENSION ORAL at 16:39

## 2023-02-14 SDOH — ECONOMIC STABILITY: FOOD INSECURITY: WITHIN THE PAST 12 MONTHS, THE FOOD YOU BOUGHT JUST DIDN'T LAST AND YOU DIDN'T HAVE MONEY TO GET MORE.: NEVER TRUE

## 2023-02-14 SDOH — ECONOMIC STABILITY: FOOD INSECURITY: WITHIN THE PAST 12 MONTHS, YOU WORRIED THAT YOUR FOOD WOULD RUN OUT BEFORE YOU GOT MONEY TO BUY MORE.: NEVER TRUE

## 2023-02-14 SDOH — ECONOMIC STABILITY: INCOME INSECURITY: HOW HARD IS IT FOR YOU TO PAY FOR THE VERY BASICS LIKE FOOD, HOUSING, MEDICAL CARE, AND HEATING?: NOT HARD AT ALL

## 2023-02-14 SDOH — ECONOMIC STABILITY: HOUSING INSECURITY
IN THE LAST 12 MONTHS, WAS THERE A TIME WHEN YOU DID NOT HAVE A STEADY PLACE TO SLEEP OR SLEPT IN A SHELTER (INCLUDING NOW)?: NO

## 2023-02-14 ASSESSMENT — PATIENT HEALTH QUESTIONNAIRE - PHQ9
1. LITTLE INTEREST OR PLEASURE IN DOING THINGS: 0
2. FEELING DOWN, DEPRESSED OR HOPELESS: 0
SUM OF ALL RESPONSES TO PHQ QUESTIONS 1-9: 0
SUM OF ALL RESPONSES TO PHQ9 QUESTIONS 1 & 2: 0

## 2023-02-14 ASSESSMENT — ENCOUNTER SYMPTOMS: ABDOMINAL PAIN: 1

## 2023-02-14 NOTE — PROGRESS NOTES
[facility] 141  [phone] [facility]. juwan [facility], [facility]  [phone]  [phone]    Narayan Anderson is a 74 y.o. male who is here with c/o of:    Chief Complaint: Abdominal Pain (Lower left abdominal pain sxs 3 days)      Patient Accompanied by: n/a    HPI - Narayan Anderson is here today with c/o:    Abdominal Pain  This is a recurrent problem. The current episode started in the past 7 days. The onset quality is sudden. The problem occurs intermittently. The problem has been waxing and waning. The pain is located in the epigastric region. The pain is at a severity of 7/10. The pain is moderate. The quality of the pain is aching. The abdominal pain radiates to the LLQ. The pain is aggravated by eating. The pain is relieved by Nothing. He has tried antacids for the symptoms. The treatment provided no relief. His past medical history is significant for GERD. Health Maintenance Due   Topic Date Due    COVID-19 Vaccine (4 - Booster for Moderna series) 02/15/2022    Flu vaccine (1) Never done    Pneumococcal 65+ years Vaccine (1 - PCV) Never done        Patient Active Problem List:     Gastroesophageal reflux disease without esophagitis     Past Medical History:   Diagnosis Date    BPH (benign prostatic hypertrophy)     Calculus, kidney     Complete tear of left rotator cuff 9/8/2019    Heel spur     Plantar fasciitis       Past Surgical History:   Procedure Laterality Date    CYSTOSCOPY      HEMORRHOID SURGERY      LUMBAR LAMINECTOMY  11/2014    NASAL POLYP SURGERY       History reviewed. No pertinent family history.   Social History     Tobacco Use    Smoking status: Never    Smokeless tobacco: Never   Substance Use Topics    Alcohol use: Yes     Comment: occasional     ALLERGIES:    Allergies   Allergen Reactions    Amoxicillin      \"headache\"    Erythromycin Hives    Tolectin [Tolmetin] Hives          Subjective   Review of Systems   Gastrointestinal:  Positive for abdominal pain. Constitutional:  Negative for activity change, appetite change,unexpected weight change, chills, fever, and fatigue. HENT: Negative for ear pain, sore throat,  Rhinorrhea, sinus pain, sinus pressure, congestion. Eyes:  Negative for pain and discharge. Respiratory:  Negative for chest tightness, shortness of breath, wheezing, and cough. Cardiovascular:  Negative for chest pain, palpitations and leg swelling. Endocrine: Negative for cold intolerance, heat intolerance, polydipsia, polyphagia and polyuria. Genitourinary: Negative for difficulty urinating, dysuria, flank pain, frequency, hematuria and urgency. Musculoskeletal: Negative for arthralgias, back pain, joint swelling, myalgias, neck pain and neck stiffness. Skin: Negative for rash and wound. Allergic/Immunologic: Negative for environmental allergies and food allergies. Neurological:  Negative for dizziness, light-headedness, numbness and headaches. Hematological:  Negative for adenopathy. Does not bruise/bleed easily. Psychiatric/Behavioral: Negative for self-injury, sleep disturbance and suicidal ideas. Objective   Physical Exam  Abdominal:      General: Bowel sounds are normal.      Palpations: Abdomen is soft. Tenderness: There is abdominal tenderness in the epigastric area and left lower quadrant. Hernia: No hernia is present. Constitutional: Angel Petit is oriented to person, place, and time. Vital signs are normal. Appears well-developed and well-nourished. Head: Normocephalic and atraumatic. Eyes:PERRL, EOMI, Conjunctiva normal, No discharge. Neck: Full passive range of motion. Non-tender on palpation. Neck supple. No thyromegaly present. Trachea normal.  Cardiovascular: Normal rate, regular rhythm, S1, S2, no murmur, no gallop, no friction rub, intact distal pulses. Pulmonary/Chest: Breath sounds are clear throughout, No respiratory distress, No wheezing, No chest tenderness.  Effort normal  Neurological: Alert and oriented to person, place, and time. Normal motor function, Normal sensory function, No focal deficits noted. He has normal strength. Skin: Skin is warm, dry and intact. No obvious lesions on exposed skin  Psychiatric: Normal mood and affect. Speech is normal and behavior is normal.     Nursing note and vitals reviewed. Blood pressure 112/78, pulse 75, temperature 98.3 °F (36.8 °C), temperature source Temporal, weight 200 lb (90.7 kg), SpO2 95 %. Body mass index is 30.41 kg/m². Wt Readings from Last 3 Encounters:   02/14/23 200 lb (90.7 kg)   09/19/22 200 lb 3.2 oz (90.8 kg)   08/22/22 200 lb (90.7 kg)     BP Readings from Last 3 Encounters:   02/14/23 112/78   09/19/22 122/84   08/22/22 118/76       Telephone on 08/17/2022   Component Date Value Ref Range Status    FIT-DNA (Cologuard) 08/29/2022 Negative  Negative Final    Comment:   NEGATIVE TEST RESULT. A negative Cologuard result indicates a low likelihood that a colorectal cancer (CRC) or advanced adenoma (adenomatous polyps with more advanced pre-malignant features)  is present. The chance that a person with a negative Cologuard test has a colorectal cancer is less than 1 in 1500 (negative predictive value >99.9%) or has an  advanced adenoma is less than  5.3% (negative predictive value 94.7%). These data are based on a prospective cross-sectional study of 10,000 individuals at average risk for colorectal cancer who were screened with both Cologuard and colonoscopy. (Giulia King al, N Engl J Med 2014;370(14):9913-1959) The normal value (reference range) for this assay is negative. COLOGUARD RE-SCREENING RECOMMENDATION: Periodic colorectal cancer screening is an important part of preventive healthcare for asymptomatic individuals at average risk for colorectal cancer.   Following a negative Cologuard result, the [address] and U.S.                            Multi-Society Task Force screening guidelines recommend a Cologuard re-screening interval of 3 years. References: American Cancer Society Guideline for Colorectal Cancer Screening: https://www.cabral.com/. org/cancer/colon-rectal-cancer/nzplkmlhu-njykfqgpr-ihnddho/acs-recommendations.html.; James OBREGON, Onelia OLSON, Sahra DOLL, Colorectal Cancer Screening: Recommendations for Physicians and Patients from the [address] [place] [address] Task Force on Colorectal Cancer Screening , Am J Gastroenterology 2017; 732:6986-2540. TEST DESCRIPTION: Composite algorithmic analysis of stool DNA-biomarkers with hemoglobin immunoassay. Quantitative values of individual biomarkers are not reportable and are not associated with individual biomarker result reference ranges. Cologuard is intended for colorectal cancer screening of adults of either sex, 39 years or older, who are at average-risk for colorectal cancer (CRC). Cologuard has been approved for use by the U.S. FDA. The performance of Cologuard was                            established in a cross sectional study of average-risk adults aged 48-80. Cologuard performance in patients ages 39 to 52 years was estimated by sub-group analysis of near-age groups. Colonoscopies performed for a positive result may find as the most clinically significant lesion: colorectal cancer [4.0%], advanced adenoma (including sessile serrated polyps greater than or equal to 1cm diameter) [20%] or non- advanced adenoma [31%]; or no colorectal neoplasia [45%]. These estimates are derived from a prospective cross-sectional screening study of 10,000 individuals at average risk for colorectal cancer who were screened with both Cologuard and colonoscopy. (Giulia King al, Jilda Speller J Med 2014;370(14):3511-1515.) Cologuard may produce a false negative or false positive result (no colorectal cancer or precancerous polyp present at colonoscopy follow up). A negative Cologuard test result does not guarantee the absence of CRC or advanced adenoma (pre-cancer).  The current Cologuard                            screening interval is every 3 years. ([facility] Task Force). Cologuard performance data in a 10,000 patient pivotal study using colonoscopy as the reference method can be accessed at the following location: www.[employer]. iCAD/results. Additional description of the Cologuard test process, warnings and precautions can be found at www.[employer]. com. No results found for this visit on 02/14/23. Completed Orders/Prescriptions   No orders of the defined types were placed in this encounter. AssessmentPlan/Medical Decision Making     1. Left lower quadrant abdominal pain    - CT ABDOMEN PELVIS W IV CONTRAST Additional Contrast? Radiologist Recommendation; Future  - CBC with Auto Differential; Future  - Comprehensive Metabolic Panel; Future  - Lipase; Future  - Urinalysis; Future  - Culture, Urine; Future    Return in about 1 week (around 2/21/2023) for follow up abdominal pain. 1.  Mau received counseling on the following healthy behaviors: n/a  2. Patient given educational materials - see patient instructions  3. Was a self-tracking handout given in paper form or via [employer]? No  If yes, see orders or list here. 4.  Discussed use, benefit, and side effects of prescribed medications. Barriers to medication compliance addressed. All patient questions answered. Pt voiced understanding. 5.  Reviewed prior labs, imaging, consultation, follow up, and health maintenance  6. Continue current medications, diet and exercise. 7. Discussed use, benefit, and side effects of prescribed medications. Barriers to medication compliance addressed. All her questions were answered. Pt voiced understanding. Brennan Herrera will continue current medications, diet and exercise.       Of the  30  minute duration appointment visit, El Blizzard, CNP spent at least 50% of the face-to-face time in counseling, explanation of diagnosis, planning of further management, and answering all questions.           Signed:  Carol Greenberg, CNP

## 2023-02-15 DIAGNOSIS — R91.8 PULMONARY NODULES: Primary | ICD-10-CM

## 2023-02-15 LAB
MICROORGANISM SPEC CULT: NO GROWTH
SPECIMEN DESCRIPTION: NORMAL

## 2023-02-21 ENCOUNTER — OFFICE VISIT (OUTPATIENT)
Dept: FAMILY MEDICINE CLINIC | Age: 68
End: 2023-02-21

## 2023-02-21 VITALS
TEMPERATURE: 97.2 F | SYSTOLIC BLOOD PRESSURE: 110 MMHG | HEART RATE: 63 BPM | DIASTOLIC BLOOD PRESSURE: 70 MMHG | OXYGEN SATURATION: 96 % | WEIGHT: 200.6 LBS | BODY MASS INDEX: 30.5 KG/M2

## 2023-02-21 DIAGNOSIS — K21.9 GASTROESOPHAGEAL REFLUX DISEASE WITHOUT ESOPHAGITIS: Primary | ICD-10-CM

## 2023-02-21 DIAGNOSIS — Z76.89 ENCOUNTER TO ESTABLISH CARE WITH NEW DOCTOR: ICD-10-CM

## 2023-02-21 DIAGNOSIS — R06.02 SOB (SHORTNESS OF BREATH): ICD-10-CM

## 2023-02-21 DIAGNOSIS — N40.0 BENIGN PROSTATIC HYPERPLASIA WITHOUT LOWER URINARY TRACT SYMPTOMS: ICD-10-CM

## 2023-02-21 DIAGNOSIS — R91.8 PULMONARY NODULES: ICD-10-CM

## 2023-02-21 RX ORDER — PANTOPRAZOLE SODIUM 20 MG/1
20 TABLET, DELAYED RELEASE ORAL DAILY
Qty: 90 TABLET | Refills: 1 | Status: SHIPPED | OUTPATIENT
Start: 2023-02-21 | End: 2023-05-22

## 2023-02-21 ASSESSMENT — ENCOUNTER SYMPTOMS: SHORTNESS OF BREATH: 1

## 2023-02-21 NOTE — PROGRESS NOTES
[place] [address]  [id number] [facility] [place]. Gage Marissa  [place], [place] [address]  [phone]  [phone]    Aurelia Dietz is a 74 y.o. male who is here with c/o of:    Chief Complaint: Establish Care (Former patient of Dia Samson [facility])      Patient Accompanied by: n/a    HPI - Aurelia Dietz is here today with c/o:    Patient here to establish care. Previous PCP: Abdulkadir Bernard  : Yes  Children: Yes  Employed: [employer]   Exercise: Yes  Diet: Tries to eat a balanced diet  Smoker: No  Alcohol: Socially  Sleep: Averages 7 hours    Chronic Conditions:    BPH  GERD    Specialists:    Urologist    Health Concerns:    Does get short of breath with exertion. 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 Reports inhaling a lot of dust, etc. Says his dad has COPD and was a tool and . Recent CT abdomen showed Pulmonary nodules. CT chest was ordered, needs to schedule still.      Health Maintenance Due   Topic Date Due    Shingles vaccine (1 of 2) Never done    DTaP/Tdap/Td vaccine (2 - Td or Tdap) 04/13/2017    COVID-19 Vaccine (4 - Booster for Moderna series) 02/15/2022    Flu vaccine (1) Never done    Pneumococcal 65+ years Vaccine (1 - PCV) Never done        Patient Active Problem List:     Gastroesophageal reflux disease without esophagitis     Past Medical History:   Diagnosis Date    BPH (benign prostatic hypertrophy)     Calculus, kidney     Complete tear of left rotator cuff 9/8/2019    Heel spur     Plantar fasciitis       Past Surgical History:   Procedure Laterality Date    CYSTOSCOPY      HEMORRHOID SURGERY      LUMBAR LAMINECTOMY  11/2014    NASAL POLYP SURGERY       Family History   Problem Relation Age of Onset    Other Mother         non hodgkins lymphoma    Stroke Mother     High Blood Pressure Mother     High Blood Pressure Father      Social History     Tobacco Use    Smoking status: Never    Smokeless tobacco: Never Substance Use Topics    Alcohol use: Yes     Comment: occasional     ALLERGIES:    Allergies   Allergen Reactions    Amoxicillin      \"headache\"    Erythromycin Hives    Tolectin [Tolmetin] Hives          Subjective   Review of Systems   Respiratory:  Positive for shortness of breath. Constitutional:  Negative for activity change, appetite change,unexpected weight change, chills, fever, and fatigue. HENT: Negative for ear pain, sore throat,  Rhinorrhea, sinus pain, sinus pressure, congestion. Eyes:  Negative for pain and discharge. Cardiovascular:  Negative for chest pain, palpitations and leg swelling. Gastrointestinal: Negative for abdominal pain, blood in stool, constipation,diarrhea, nausea and vomiting. Endocrine: Negative for cold intolerance, heat intolerance, polydipsia, polyphagia and polyuria. Genitourinary: Negative for difficulty urinating, dysuria, flank pain, frequency, hematuria and urgency. Musculoskeletal: Negative for arthralgias, back pain, joint swelling, myalgias, neck pain and neck stiffness. Skin: Negative for rash and wound. Allergic/Immunologic: Negative for environmental allergies and food allergies. Neurological:  Negative for dizziness, light-headedness, numbness and headaches. Hematological:  Negative for adenopathy. Does not bruise/bleed easily. Psychiatric/Behavioral: Negative for self-injury, sleep disturbance and suicidal ideas. Objective   Physical Exam   PHYSICAL EXAM:   Constitutional: Hanahan Chance is oriented to person, place, and time. Vital signs are normal. Appears well-developed and well-nourished. Head: Normocephalic and atraumatic. Eyes:PERRL, EOMI, Conjunctiva normal, No discharge. Neck: Full passive range of motion. Non-tender on palpation. Neck supple. No thyromegaly present. Trachea normal.  Cardiovascular: Normal rate, regular rhythm, S1, S2, no murmur, no gallop, no friction rub, intact distal pulses.     Pulmonary/Chest: Breath sounds are clear throughout, No respiratory distress, No wheezing, No chest tenderness. Effort normal  Abdominal: Soft. Normal appearance, bowel sounds are present and normoactive. There is no hepatosplenomegaly. There is no tenderness. There is no CVA tenderness. Musculoskeletal: Extremities appear regular and symmetric. No evident masses, lesions, foreign bodies, or other abnormalities. No edema. No tenderness on palpation. Joints are stable. Full ROM, strength and tone are within normal limits. Neurological: Alert and oriented to person, place, and time. Normal motor function, Normal sensory function, No focal deficits noted. He has normal strength. Skin: Skin is warm, dry and intact. No obvious lesions on exposed skin  Psychiatric: Normal mood and affect. Speech is normal and behavior is normal.     Nursing note and vitals reviewed. Blood pressure 110/70, pulse 63, temperature 97.2 °F (36.2 °C), temperature source Temporal, weight 200 lb 9.6 oz (91 kg), SpO2 96 %. Body mass index is 30.5 kg/m².     Wt Readings from Last 3 Encounters:   02/21/23 200 lb 9.6 oz (91 kg)   02/14/23 200 lb (90.7 kg)   09/19/22 200 lb 3.2 oz (90.8 kg)     BP Readings from Last 3 Encounters:   02/21/23 110/70   02/14/23 112/78   09/19/22 122/84       Hospital Outpatient Visit on 02/14/2023   Component Date Value Ref Range Status    WBC 02/14/2023 6.6  3.5 - 11.3 k/uL Final    RBC 02/14/2023 5.12  4.21 - 5.77 m/uL Final    Hemoglobin 02/14/2023 15.6  13.0 - 17.0 g/dL Final    Hematocrit 02/14/2023 46.1  40.7 - 50.3 % Final    MCV 02/14/2023 90.0  82.6 - 102.9 fL Final    MCH 02/14/2023 30.5  25.2 - 33.5 pg Final    MCHC 02/14/2023 33.8  28.4 - 34.8 g/dL Final    RDW 02/14/2023 12.9  11.8 - 14.4 % Final    Platelets 47/49/2683 200  138 - 453 k/uL Final    MPV 02/14/2023 11.3  8.1 - 13.5 fL Final    NRBC Automated 02/14/2023 0.0  0.0 per 100 WBC Final    Seg Neutrophils 02/14/2023 70 (A)  36 - 65 % Final    Lymphocytes 02/14/2023 18 (A)  24 - 43 % Final    Monocytes 02/14/2023 9  3 - 12 % Final    Eosinophils % 02/14/2023 2  1 - 4 % Final    Basophils 02/14/2023 1  0 - 2 % Final    Immature Granulocytes 02/14/2023 0  0 % Final    Segs Absolute 02/14/2023 4.64  1.50 - 8.10 k/uL Final    Absolute Lymph # 02/14/2023 1.18  1.10 - 3.70 k/uL Final    Absolute Mono # 02/14/2023 0.57  0.10 - 1.20 k/uL Final    Absolute Eos # 02/14/2023 0.11  0.00 - 0.44 k/uL Final    Basophils Absolute 02/14/2023 0.03  0.00 - 0.20 k/uL Final    Absolute Immature Granulocyte 02/14/2023 <0.03  0.00 - 0.30 k/uL Final    Glucose 02/14/2023 93  70 - 99 mg/dL Final    BUN 02/14/2023 16  8 - 23 mg/dL Final    Creatinine 02/14/2023 1.01  0.70 - 1.20 mg/dL Final    Est, Glom Filt Rate 02/14/2023 >60  >60 mL/min/1.73m2 Final    Comment:       These results are not intended for use in patients <25years of age. eGFR results are calculated without a race factor using the 2021 CKD-EPI equation. Careful clinical correlation is recommended, particularly when comparing to results   calculated using previous equations. The CKD-EPI equation is less accurate in patients with extremes of muscle mass, extra-renal   metabolism of creatine, excessive creatine ingestion, or following therapy that affects   renal tubular secretion.       Calcium 02/14/2023 9.5  8.6 - 10.4 mg/dL Final    Sodium 02/14/2023 141  135 - 144 mmol/L Final    Potassium 02/14/2023 4.8  3.7 - 5.3 mmol/L Final    Chloride 02/14/2023 104  98 - 107 mmol/L Final    CO2 02/14/2023 25  20 - 31 mmol/L Final    Anion Gap 02/14/2023 12  9 - 17 mmol/L Final    Alkaline Phosphatase 02/14/2023 90  40 - 129 U/L Final    ALT 02/14/2023 24  5 - 41 U/L Final    AST 02/14/2023 27  <40 U/L Final    Total Bilirubin 02/14/2023 0.6  0.3 - 1.2 mg/dL Final    Total Protein 02/14/2023 7.3  6.4 - 8.3 g/dL Final    Albumin 02/14/2023 4.5  3.5 - 5.2 g/dL Final    Albumin/Globulin Ratio 02/14/2023 1.6  1.0 - 2.5 Final    Lipase 02/14/2023 41  13 - 60 U/L Final    Color, UA 02/14/2023 Yellow  Yellow Final    Turbidity UA 02/14/2023 Clear  Clear Final    Glucose, Ur 02/14/2023 NEGATIVE  NEGATIVE Final    Bilirubin Urine 02/14/2023 NEGATIVE  NEGATIVE Final    Ketones, Urine 02/14/2023 NEGATIVE  NEGATIVE Final    Specific Gravity, UA 02/14/2023 1.023  1.005 - 1.030 Final    Urine Hgb 02/14/2023 NEGATIVE  NEGATIVE Final    pH, UA 02/14/2023 6.5  5.0 - 8.0 Final    Protein, UA 02/14/2023 NEGATIVE  NEGATIVE Final    Urobilinogen, Urine 02/14/2023 Normal  Normal Final    Nitrite, Urine 02/14/2023 NEGATIVE  NEGATIVE Final    Leukocyte Esterase, Urine 02/14/2023 NEGATIVE  NEGATIVE Final    Urinalysis Comments 02/14/2023 Microscopic exam not performed based on chemical results unless requested in original order. Final    Specimen Description 02/14/2023 . CLEAN CATCH URINE   Final    Culture 02/14/2023 NO GROWTH   Final     No results found for this visit on 02/21/23. Completed Orders/Prescriptions   Orders Placed This Encounter   Medications    pantoprazole (PROTONIX) 20 MG tablet     Sig: Take 1 tablet by mouth daily     Dispense:  90 tablet     Refill:  1               AssessmentPlan/Medical Decision Making     1. Gastroesophageal reflux disease without esophagitis    - Stable  - pantoprazole (PROTONIX) 20 MG tablet; Take 1 tablet by mouth daily  Dispense: 90 tablet; Refill: 1    2. Benign prostatic hyperplasia without lower urinary tract symptoms    - Follows with Urology    3. SOB (shortness of breath)      4. Pulmonary nodules    - Has upcoming CT chest for evaluation    5. Encounter to establish care with new doctor      Return in about 1 year (around 2/21/2024) for chronic conditions. 1Ryley León received counseling on the following healthy behaviors: nutrition, exercise, and medication adherence  2. Patient given educational materials - see patient instructions  3. Was a self-tracking handout given in paper form or via [employer]?  No  If yes, see orders or list here. 4.  Discussed use, benefit, and side effects of prescribed medications. Barriers to medication compliance addressed. All patient questions answered. Pt voiced understanding. 5.  Reviewed prior labs, imaging, consultation, follow up, and health maintenance  6. Continue current medications, diet and exercise. 7. Discussed use, benefit, and side effects of prescribed medications. Barriers to medication compliance addressed. All her questions were answered. Pt voiced understanding. Prema Jimenes will continue current medications, diet and exercise. Of the  30  minute duration appointment visit, Fredy Delgado CNP spent at least 50% of the face-to-face time in counseling, explanation of diagnosis, planning of further management, and answering all questions.           Signed:  Fredy Delgado CNP

## 2023-07-31 ENCOUNTER — OFFICE VISIT (OUTPATIENT)
Dept: FAMILY MEDICINE CLINIC | Age: 68
End: 2023-07-31
Payer: MEDICARE

## 2023-07-31 VITALS — SYSTOLIC BLOOD PRESSURE: 128 MMHG | DIASTOLIC BLOOD PRESSURE: 76 MMHG | TEMPERATURE: 97.5 F | HEART RATE: 78 BPM

## 2023-07-31 DIAGNOSIS — J01.40 ACUTE NON-RECURRENT PANSINUSITIS: Primary | ICD-10-CM

## 2023-07-31 PROCEDURE — 1036F TOBACCO NON-USER: CPT | Performed by: NURSE PRACTITIONER

## 2023-07-31 PROCEDURE — 99213 OFFICE O/P EST LOW 20 MIN: CPT | Performed by: NURSE PRACTITIONER

## 2023-07-31 PROCEDURE — 1123F ACP DISCUSS/DSCN MKR DOCD: CPT | Performed by: NURSE PRACTITIONER

## 2023-07-31 PROCEDURE — 3017F COLORECTAL CA SCREEN DOC REV: CPT | Performed by: NURSE PRACTITIONER

## 2023-07-31 PROCEDURE — G8427 DOCREV CUR MEDS BY ELIG CLIN: HCPCS | Performed by: NURSE PRACTITIONER

## 2023-07-31 PROCEDURE — G8417 CALC BMI ABV UP PARAM F/U: HCPCS | Performed by: NURSE PRACTITIONER

## 2023-07-31 RX ORDER — DOXYCYCLINE HYCLATE 100 MG
100 TABLET ORAL 2 TIMES DAILY
Qty: 14 TABLET | Refills: 0 | Status: SHIPPED | OUTPATIENT
Start: 2023-07-31 | End: 2023-08-07

## 2023-07-31 RX ORDER — FLUTICASONE PROPIONATE 50 MCG
1 SPRAY, SUSPENSION (ML) NASAL DAILY
Qty: 16 G | Refills: 0 | Status: SHIPPED | OUTPATIENT
Start: 2023-07-31

## 2023-07-31 ASSESSMENT — PATIENT HEALTH QUESTIONNAIRE - PHQ9
SUM OF ALL RESPONSES TO PHQ9 QUESTIONS 1 & 2: 0
SUM OF ALL RESPONSES TO PHQ QUESTIONS 1-9: 0
1. LITTLE INTEREST OR PLEASURE IN DOING THINGS: 0
2. FEELING DOWN, DEPRESSED OR HOPELESS: 0
SUM OF ALL RESPONSES TO PHQ QUESTIONS 1-9: 0

## 2023-07-31 ASSESSMENT — ENCOUNTER SYMPTOMS
COUGH: 1
SINUS PRESSURE: 1

## 2023-07-31 NOTE — PROGRESS NOTES
Juan Daniel Enrique, [address]  [address] [facility]. Leela Landis, [address]  [phone]  [phone]    Jerzy Thakur is a 74 y.o. male who is here with c/o of:    Chief Complaint: Head Congestion (Post nasal drip since Friday, no fever)      Patient Accompanied by: n/a    HPI - Jerzy Body is here today with c/o:    Patient here with complaints of sinus congestion, cough, headache that started Friday. Denies fevers or chills. Has been taking Zyrtec- D with some relief. He has been having thick green nasal discharge. Health Maintenance Due   Topic Date Due    Shingles vaccine (1 of 2) Never done    DTaP/Tdap/Td vaccine (2 - Td or Tdap) 04/13/2017    COVID-19 Vaccine (4 - Booster for Moderna series) 02/15/2022    Pneumococcal 65+ years Vaccine (1 - PCV) Never done        Patient Active Problem List:     Gastroesophageal reflux disease without esophagitis     Benign prostatic hyperplasia without lower urinary tract symptoms     Past Medical History:   Diagnosis Date    BPH (benign prostatic hypertrophy)     Calculus, kidney     Complete tear of left rotator cuff 09/08/2019    GERD (gastroesophageal reflux disease)     Heel spur     Plantar fasciitis       Past Surgical History:   Procedure Laterality Date    CYSTOSCOPY      HEMORRHOID SURGERY      LUMBAR LAMINECTOMY  11/2014    NASAL POLYP SURGERY       Family History   Problem Relation Age of Onset    Other Mother         non hodgkins lymphoma    Stroke Mother     High Blood Pressure Mother     Other Father         COPD    High Blood Pressure Father      Social History     Tobacco Use    Smoking status: Never    Smokeless tobacco: Never   Substance Use Topics    Alcohol use: Yes     Comment: occasional     ALLERGIES:    Allergies   Allergen Reactions    Amoxicillin      \"headache\"    Erythromycin Hives    Tolectin [Tolmetin] Hives          Subjective   Review of Systems   Constitutional:  Negative for chills and fever.    HENT:

## 2023-08-17 ENCOUNTER — TELEPHONE (OUTPATIENT)
Dept: FAMILY MEDICINE CLINIC | Age: 68
End: 2023-08-17

## 2023-08-17 DIAGNOSIS — R91.8 PULMONARY NODULES: Primary | ICD-10-CM

## 2023-08-21 ENCOUNTER — HOSPITAL ENCOUNTER (OUTPATIENT)
Dept: CT IMAGING | Facility: CLINIC | Age: 68
Discharge: HOME | End: 2023-08-23
Payer: MEDICARE

## 2023-08-21 DIAGNOSIS — R91.8 PULMONARY NODULES: ICD-10-CM

## 2023-08-21 PROCEDURE — 71250 CT THORAX DX C-: CPT

## 2023-08-27 DIAGNOSIS — K21.9 GASTROESOPHAGEAL REFLUX DISEASE WITHOUT ESOPHAGITIS: ICD-10-CM

## 2023-08-28 RX ORDER — PANTOPRAZOLE SODIUM 20 MG/1
TABLET, DELAYED RELEASE ORAL
Qty: 90 TABLET | Refills: 1 | Status: SHIPPED | OUTPATIENT
Start: 2023-08-28

## 2023-08-28 NOTE — TELEPHONE ENCOUNTER
Macy Lindsey is calling to request a refill on the following medication(s):    Medication Request:  Requested Prescriptions     Pending Prescriptions Disp Refills    pantoprazole (PROTONIX) 20 MG tablet [Pharmacy Med Name: PANTOPRAZOLE SOD DR 20 MG TAB] 90 tablet 1     Sig: TAKE ONE TABLET BY MOUTH DAILY       Last Visit Date (If Applicable):  1/71/1223    Next Visit Date:    9/20/2023

## 2023-09-20 ENCOUNTER — OFFICE VISIT (OUTPATIENT)
Dept: FAMILY MEDICINE CLINIC | Age: 68
End: 2023-09-20
Payer: MEDICARE

## 2023-09-20 VITALS
SYSTOLIC BLOOD PRESSURE: 112 MMHG | BODY MASS INDEX: 30.44 KG/M2 | TEMPERATURE: 96.9 F | OXYGEN SATURATION: 95 % | HEART RATE: 72 BPM | WEIGHT: 200.2 LBS | DIASTOLIC BLOOD PRESSURE: 84 MMHG

## 2023-09-20 DIAGNOSIS — H91.93 HEARING DIFFICULTY OF BOTH EARS: ICD-10-CM

## 2023-09-20 DIAGNOSIS — K21.9 GASTROESOPHAGEAL REFLUX DISEASE WITHOUT ESOPHAGITIS: ICD-10-CM

## 2023-09-20 DIAGNOSIS — Z00.00 INITIAL MEDICARE ANNUAL WELLNESS VISIT: Primary | ICD-10-CM

## 2023-09-20 PROCEDURE — 1123F ACP DISCUSS/DSCN MKR DOCD: CPT | Performed by: NURSE PRACTITIONER

## 2023-09-20 PROCEDURE — 3017F COLORECTAL CA SCREEN DOC REV: CPT | Performed by: NURSE PRACTITIONER

## 2023-09-20 PROCEDURE — G0438 PPPS, INITIAL VISIT: HCPCS | Performed by: NURSE PRACTITIONER

## 2023-09-20 RX ORDER — PANTOPRAZOLE SODIUM 20 MG/1
20 TABLET, DELAYED RELEASE ORAL DAILY
Qty: 90 TABLET | Refills: 1 | Status: SHIPPED | OUTPATIENT
Start: 2023-09-20

## 2023-09-20 ASSESSMENT — LIFESTYLE VARIABLES
HOW OFTEN DO YOU HAVE A DRINK CONTAINING ALCOHOL: 2-4 TIMES A MONTH
HOW MANY STANDARD DRINKS CONTAINING ALCOHOL DO YOU HAVE ON A TYPICAL DAY: 1 OR 2

## 2023-09-20 ASSESSMENT — PATIENT HEALTH QUESTIONNAIRE - PHQ9
SUM OF ALL RESPONSES TO PHQ QUESTIONS 1-9: 0
SUM OF ALL RESPONSES TO PHQ QUESTIONS 1-9: 0
SUM OF ALL RESPONSES TO PHQ9 QUESTIONS 1 & 2: 0
SUM OF ALL RESPONSES TO PHQ QUESTIONS 1-9: 0
1. LITTLE INTEREST OR PLEASURE IN DOING THINGS: 0
SUM OF ALL RESPONSES TO PHQ QUESTIONS 1-9: 0
2. FEELING DOWN, DEPRESSED OR HOPELESS: 0

## 2024-01-19 DIAGNOSIS — J01.40 ACUTE NON-RECURRENT PANSINUSITIS: ICD-10-CM

## 2024-01-19 RX ORDER — FLUTICASONE PROPIONATE 50 MCG
SPRAY, SUSPENSION (ML) NASAL
Qty: 3 EACH | Refills: 3 | Status: SHIPPED | OUTPATIENT
Start: 2024-01-19

## 2024-01-19 NOTE — TELEPHONE ENCOUNTER
Mau Grijalva is calling to request a refill on the following medication(s):    Medication Request:  Requested Prescriptions     Pending Prescriptions Disp Refills    fluticasone (FLONASE) 50 MCG/ACT nasal spray [Pharmacy Med Name: FLUTICASONE PROP 50 MCG SPRAY] 3 each 3     Sig: SPRAY ONE SPRAY IN EACH NOSTRIL ONCE DAILY       Last Visit Date (If Applicable):  9/20/2023    Next Visit Date:    9/23/2024

## 2024-09-11 ENCOUNTER — TELEPHONE (OUTPATIENT)
Dept: FAMILY MEDICINE CLINIC | Age: 69
End: 2024-09-11

## 2024-09-23 ENCOUNTER — OFFICE VISIT (OUTPATIENT)
Dept: FAMILY MEDICINE CLINIC | Age: 69
End: 2024-09-23
Payer: MEDICARE

## 2024-09-23 ENCOUNTER — HOSPITAL ENCOUNTER (OUTPATIENT)
Age: 69
Discharge: HOME | End: 2024-09-25
Payer: MEDICARE

## 2024-09-23 ENCOUNTER — HOSPITAL ENCOUNTER (OUTPATIENT)
Age: 69
Setting detail: SPECIMEN
Discharge: HOME | End: 2024-09-23

## 2024-09-23 ENCOUNTER — HOSPITAL ENCOUNTER (OUTPATIENT)
Dept: GENERAL RADIOLOGY | Age: 69
Discharge: HOME | End: 2024-09-25
Payer: MEDICARE

## 2024-09-23 VITALS
WEIGHT: 202 LBS | OXYGEN SATURATION: 93 % | SYSTOLIC BLOOD PRESSURE: 110 MMHG | BODY MASS INDEX: 30.71 KG/M2 | HEART RATE: 72 BPM | TEMPERATURE: 97 F | DIASTOLIC BLOOD PRESSURE: 82 MMHG

## 2024-09-23 DIAGNOSIS — Z00.00 MEDICARE ANNUAL WELLNESS VISIT, SUBSEQUENT: Primary | ICD-10-CM

## 2024-09-23 DIAGNOSIS — Z13.220 SCREENING, LIPID: ICD-10-CM

## 2024-09-23 DIAGNOSIS — K21.9 GASTROESOPHAGEAL REFLUX DISEASE WITHOUT ESOPHAGITIS: ICD-10-CM

## 2024-09-23 DIAGNOSIS — Z71.89 ADVANCE CARE PLANNING: ICD-10-CM

## 2024-09-23 DIAGNOSIS — M25.552 LEFT HIP PAIN: ICD-10-CM

## 2024-09-23 DIAGNOSIS — Z23 IMMUNIZATION DUE: ICD-10-CM

## 2024-09-23 DIAGNOSIS — Z13.29 SCREENING FOR THYROID DISORDER: ICD-10-CM

## 2024-09-23 LAB
ALBUMIN SERPL-MCNC: 4.6 G/DL (ref 3.5–5.2)
ALBUMIN/GLOB SERPL: 2 {RATIO} (ref 1–2.5)
ALP SERPL-CCNC: 76 U/L (ref 40–129)
ALT SERPL-CCNC: 25 U/L (ref 10–50)
ANION GAP SERPL CALCULATED.3IONS-SCNC: 11 MMOL/L (ref 9–16)
AST SERPL-CCNC: 27 U/L (ref 10–50)
BASOPHILS # BLD: 0.07 K/UL (ref 0–0.2)
BASOPHILS NFR BLD: 1 % (ref 0–2)
BILIRUB SERPL-MCNC: 0.3 MG/DL (ref 0–1.2)
BUN SERPL-MCNC: 14 MG/DL (ref 8–23)
CALCIUM SERPL-MCNC: 9.6 MG/DL (ref 8.6–10.4)
CHLORIDE SERPL-SCNC: 104 MMOL/L (ref 98–107)
CHOLEST SERPL-MCNC: 166 MG/DL (ref 0–199)
CHOLESTEROL/HDL RATIO: 4
CO2 SERPL-SCNC: 25 MMOL/L (ref 20–31)
CREAT SERPL-MCNC: 1 MG/DL (ref 0.7–1.2)
EOSINOPHIL # BLD: 0.33 K/UL (ref 0–0.44)
EOSINOPHILS RELATIVE PERCENT: 5 % (ref 1–4)
ERYTHROCYTE [DISTWIDTH] IN BLOOD BY AUTOMATED COUNT: 13.3 % (ref 11.8–14.4)
GFR, ESTIMATED: 86 ML/MIN/1.73M2
GLUCOSE SERPL-MCNC: 95 MG/DL (ref 74–99)
HCT VFR BLD AUTO: 46.9 % (ref 40.7–50.3)
HDLC SERPL-MCNC: 45 MG/DL
HGB BLD-MCNC: 15.7 G/DL (ref 13–17)
IMM GRANULOCYTES # BLD AUTO: <0.03 K/UL (ref 0–0.3)
IMM GRANULOCYTES NFR BLD: 0 %
LDLC SERPL CALC-MCNC: 101 MG/DL (ref 0–100)
LYMPHOCYTES NFR BLD: 1 K/UL (ref 1.1–3.7)
LYMPHOCYTES RELATIVE PERCENT: 16 % (ref 24–43)
MCH RBC QN AUTO: 30.1 PG (ref 25.2–33.5)
MCHC RBC AUTO-ENTMCNC: 33.5 G/DL (ref 28.4–34.8)
MCV RBC AUTO: 90 FL (ref 82.6–102.9)
MONOCYTES NFR BLD: 0.48 K/UL (ref 0.1–1.2)
MONOCYTES NFR BLD: 8 % (ref 3–12)
NEUTROPHILS NFR BLD: 70 % (ref 36–65)
NEUTS SEG NFR BLD: 4.29 K/UL (ref 1.5–8.1)
NRBC BLD-RTO: 0 PER 100 WBC
PLATELET # BLD AUTO: 149 K/UL (ref 138–453)
PMV BLD AUTO: 11.2 FL (ref 8.1–13.5)
POTASSIUM SERPL-SCNC: 4.4 MMOL/L (ref 3.7–5.3)
PROT SERPL-MCNC: 7 G/DL (ref 6.6–8.7)
RBC # BLD AUTO: 5.21 M/UL (ref 4.21–5.77)
SODIUM SERPL-SCNC: 140 MMOL/L (ref 136–145)
TRIGL SERPL-MCNC: 100 MG/DL (ref 0–149)
TSH SERPL DL<=0.05 MIU/L-ACNC: 2.43 UIU/ML (ref 0.27–4.2)
VLDLC SERPL CALC-MCNC: 20 MG/DL
WBC OTHER # BLD: 6.2 K/UL (ref 3.5–11.3)

## 2024-09-23 PROCEDURE — 90677 PCV20 VACCINE IM: CPT | Performed by: NURSE PRACTITIONER

## 2024-09-23 PROCEDURE — G8417 CALC BMI ABV UP PARAM F/U: HCPCS | Performed by: NURSE PRACTITIONER

## 2024-09-23 PROCEDURE — 1036F TOBACCO NON-USER: CPT | Performed by: NURSE PRACTITIONER

## 2024-09-23 PROCEDURE — 3017F COLORECTAL CA SCREEN DOC REV: CPT | Performed by: NURSE PRACTITIONER

## 2024-09-23 PROCEDURE — 1123F ACP DISCUSS/DSCN MKR DOCD: CPT | Performed by: NURSE PRACTITIONER

## 2024-09-23 PROCEDURE — G8427 DOCREV CUR MEDS BY ELIG CLIN: HCPCS | Performed by: NURSE PRACTITIONER

## 2024-09-23 PROCEDURE — G0009 ADMIN PNEUMOCOCCAL VACCINE: HCPCS | Performed by: NURSE PRACTITIONER

## 2024-09-23 PROCEDURE — 99213 OFFICE O/P EST LOW 20 MIN: CPT | Performed by: NURSE PRACTITIONER

## 2024-09-23 PROCEDURE — 73502 X-RAY EXAM HIP UNI 2-3 VIEWS: CPT

## 2024-09-23 PROCEDURE — G0439 PPPS, SUBSEQ VISIT: HCPCS | Performed by: NURSE PRACTITIONER

## 2024-09-23 SDOH — ECONOMIC STABILITY: FOOD INSECURITY: WITHIN THE PAST 12 MONTHS, THE FOOD YOU BOUGHT JUST DIDN'T LAST AND YOU DIDN'T HAVE MONEY TO GET MORE.: NEVER TRUE

## 2024-09-23 SDOH — ECONOMIC STABILITY: FOOD INSECURITY: WITHIN THE PAST 12 MONTHS, YOU WORRIED THAT YOUR FOOD WOULD RUN OUT BEFORE YOU GOT MONEY TO BUY MORE.: NEVER TRUE

## 2024-09-23 SDOH — ECONOMIC STABILITY: INCOME INSECURITY: HOW HARD IS IT FOR YOU TO PAY FOR THE VERY BASICS LIKE FOOD, HOUSING, MEDICAL CARE, AND HEATING?: NOT HARD AT ALL

## 2024-09-23 ASSESSMENT — LIFESTYLE VARIABLES
HOW MANY STANDARD DRINKS CONTAINING ALCOHOL DO YOU HAVE ON A TYPICAL DAY: 1 OR 2
HOW OFTEN DO YOU HAVE A DRINK CONTAINING ALCOHOL: 2-4 TIMES A MONTH

## 2024-09-23 ASSESSMENT — PATIENT HEALTH QUESTIONNAIRE - PHQ9
SUM OF ALL RESPONSES TO PHQ QUESTIONS 1-9: 0
2. FEELING DOWN, DEPRESSED OR HOPELESS: NOT AT ALL
SUM OF ALL RESPONSES TO PHQ QUESTIONS 1-9: 0
SUM OF ALL RESPONSES TO PHQ QUESTIONS 1-9: 0
SUM OF ALL RESPONSES TO PHQ9 QUESTIONS 1 & 2: 0
SUM OF ALL RESPONSES TO PHQ QUESTIONS 1-9: 0
1. LITTLE INTEREST OR PLEASURE IN DOING THINGS: NOT AT ALL

## 2024-09-24 ENCOUNTER — CLINICAL DOCUMENTATION (OUTPATIENT)
Dept: SPIRITUAL SERVICES | Age: 69
End: 2024-09-24

## 2024-10-07 ENCOUNTER — HOSPITAL ENCOUNTER (OUTPATIENT)
Dept: PHYSICAL THERAPY | Facility: CLINIC | Age: 69
Setting detail: THERAPIES SERIES
Discharge: HOME | End: 2024-10-07
Payer: MEDICARE

## 2024-10-07 PROCEDURE — 97161 PT EVAL LOW COMPLEX 20 MIN: CPT

## 2024-10-07 PROCEDURE — 97110 THERAPEUTIC EXERCISES: CPT

## 2024-10-07 NOTE — CONSULTS
response to tx    Evaluation Complexity:  History (Personal factors, comorbidities) [] 0 [x] 1-2 [] 3+   Exam (limitations, restrictions) [] 1-2 [] 3 [x] 4+   Clinical presentation (progression) [x] Stable [] Evolving  [] Unstable   Decision Making [x] Low [] Moderate [] High    [x] Low Complexity [] Moderate Complexity [] High Complexity       Treatment Charges: Mins Units   [x] Evaluation       [x]  Low       []  Moderate       []  High 30 1   []  Modalities     [x]  Ther Exercise 10 1   []  Manual Therapy     []  Ther Activities     []  Aquatics     []  Vasocompression     []  Other       TOTAL BILLABLE TIME: 40 minutes    Time in: 8:00 am      Time Out: 8:40 am       Electronically signed by: Jocelyn Hardwick PT        Physician Signature:________________________________Date:__________________  By signing above or cosigning this note, I have reviewed this plan of care and certify a need for medically necessary rehabilitation services.     *PLEASE SIGN ABOVE AND FAX BACK ALL PAGES*

## 2024-10-07 NOTE — PLAN OF CARE
softball and golfing all summer, unsure if this is related. Pain now radiates into lateral calf and knee. Pt describes numbness at L foot however this was present prior to this injury- had surgery to L3-L5 in 2014. Pt does describe feeling like L knee will occasionally buckle or give out. Pt denies any swelling. Pt denies use of AD. Denies any falls. Pt describes pain as static, and constant, does not seem to worsen with any specific activities like sitting or standing prolonged. Pt does take tylenol with minimal relief. Pt has been to chiropractor 2x for this, seemed to help temporarily. Has continued to golf intermittently.      Assessment:    69 y.o. male presents to physical therapy with acute L hip pain with LLE radicular symptoms, reduced lumbopelvic mobility, impaired L hip strength, impaired gait and stability. These impairments are limiting the patient's ability to complete daily activities including work as a , recreational activities such as golf and softball, sleeping. Patient does have history of lumbar surgery 10 years ago with residual L drop foot. Pertinent exam findings include (+) MOON. Unable to determine directional preference as pt describes relief in radicular symptoms with both flexion and extension biased positions, does describe relief with LAD as well. Differential diagnosis includes lumbar radiculopathy versus piriformis syndrome. Patient would benefit from skilled physical therapy services in order to: reduce L leg pain, improve lumbopelvic mobility, improve global LLE Strength and stability to ease difficulty with all daily activities including work and recreational exercise.     Problems:    [x] ? Pain 0-4/10 pain L posterior hip, L leg  [x] ? ROM reduced LE flexibility (HS, piriformis)  [x] ? Strength impaired hip girdle strength  [x] ? Function impaired function per LEFI score 12.5%  [x] Other impaired gait, impaired SLS        Goals  MET NOT MET ON-  GOING  Details   Date

## 2024-10-09 ENCOUNTER — CLINICAL DOCUMENTATION (OUTPATIENT)
Dept: SPIRITUAL SERVICES | Age: 69
End: 2024-10-09

## 2024-10-09 NOTE — ACP (ADVANCE CARE PLANNING)
Advance Care Planning     Advance Care Planning Clinical Specialist  Conversation Note      Date of ACP Conversation: 10/9/2024    Conversation Conducted with: Patient with Decision Making Capacity    ACP Clinical Specialist: MARIO ALBERTO Willett  Healthcare Decision Maker:     Current Designated Healthcare Decision Maker:     Primary Decision Maker: Yanira Grijalva - Spouse - [phone]    Secondary Decision Maker: Richar Grijalva - Child - [phone]    Secondary Decision Maker: Aydee Estevez - Child - [phone]  Click here to complete Healthcare Decision Makers including section of the Healthcare Decision Maker Relationship (ie \"Primary\")  Today we completed the ACP conversation, nomination of HCDM's and completed pt's AD documents.     Care Preferences    Hospitalization:  \"If your health worsens and it becomes clear that your chance of recovery is unlikely, what would your preference be regarding hospitalization?\"    Choice:  [] The patient wants hospitalization.  [x] The patient prefers comfort-focused treatment without hospitalization.    Ventilation:  \"If you were in your present state of health and suddenly became very ill and were unable to breathe on your own, what would your preference be about the use of a ventilator (breathing machine) if it were available to you?\"      If the patient would desire the use of ventilator (breathing machine), answer \"yes\".  If not, \"no\": yes    \"If your health worsens and it becomes clear that your chance of recovery is unlikely, what would your preference be about the use of a ventilator (breathing machine) if it were available to you?\"     Would the patient desire the use of ventilator (breathing machine)?: No      Resuscitation  \"CPR works best to restart the heart when there is a sudden event, like a heart attack, in someone who is otherwise healthy. Unfortunately, CPR does not typically restart the heart for people who have serious health conditions or

## 2024-10-15 ENCOUNTER — HOSPITAL ENCOUNTER (OUTPATIENT)
Dept: PHYSICAL THERAPY | Facility: CLINIC | Age: 69
Setting detail: THERAPIES SERIES
Discharge: HOME | End: 2024-10-15
Payer: MEDICARE

## 2024-10-15 PROCEDURE — 97110 THERAPEUTIC EXERCISES: CPT

## 2024-10-15 NOTE — FLOWSHEET NOTE
benefit from skilled physical therapy services in order to: reduce L leg pain, improve lumbopelvic mobility, improve global LLE Strength and stability to ease difficulty with all daily activities including work and recreational exercise.     STG/LTG    Goals  MET NOT MET ON-  GOING  Details   Date Addressed:            STG: To be met in 6 treatments            1. ? Pain: Decrease LLE pain levels to 3/10 or less to ease ADL progression. []  []  []      2. ? ROM: Increase LE flexibility with (-) MOON testing and bilateral SLR to at least 75 degrees to reduce difficulty with ADLs []  []  []      3. ? Strength: Increase B hip strength to 5/5 throughout to ease functional limitations and mobility  []  []  []      4. Independent with Home Exercise Programs with ability to demonstrate exercises without cueing for technique.  []  []  []                  Date Addressed:            LTG: To be met in 12 treatments           1. Improve score on assessment tool LEFI from 12.5% impairment to less than 8% impairment to demonstrate improved functional mobility []  []  []      2. Reduce LLE pain levels to 0-1/10 or less with all work related and recreational activities for improved quality of life.  []  []  []      3. Pt to report ability to sleep throughout the night without disturbance due to L leg pain for improved quality of life. []  []  []      4. Pt to ambulate at least 300' with appropriate mechanics and without increased LLE pain to ease community ambulation. []  []  []                           Patient goals: reduce pain    Pt. Education:  [x] Yes  [] No  [x] Reviewed Prior HEP/Ed  Method of Education: [x] Verbal  [x] Demo  [x] Written  Access Code: [email or web address]  URL: [email or web address]  Date: 10/15/2024  Prepared by: Jocelyn Castellon    Exercises  - Supine Lower Trunk Rotation  - 1 x daily - 7 x weekly - 5 reps - 5\" hold  - Supine Sciatic Nerve Glide  - 1 x daily - 7 x weekly - 5 reps - 5\" hold  - Supine Bridge  - 1

## 2024-10-18 ENCOUNTER — HOSPITAL ENCOUNTER (OUTPATIENT)
Dept: PHYSICAL THERAPY | Facility: CLINIC | Age: 69
Setting detail: THERAPIES SERIES
Discharge: HOME | End: 2024-10-18
Payer: MEDICARE

## 2024-10-18 PROCEDURE — 97110 THERAPEUTIC EXERCISES: CPT

## 2024-10-18 NOTE — FLOWSHEET NOTE
[] [facility]  Outpatient Rehabilitation &  Therapy  [address]  [phone]  [phone] [x] [facility]  Outpatient Rehabilitation &  Therapy  [address]  P: [phone]  F: [phone] [] [facility]  Outpatient Rehabilitation &  Therapy  [address]  P: [phone]  F: [phone] [] [facility]  Outpatient Rehabilitation &  Therapy  [address]  [phone]  [phone] [] [facility]  Outpatient Rehabilitation &  Therapy  [address]   P: [phone]  F: [phone]  [] [facility]  Outpatient Rehabilitation &  Therapy  [address]  P: [phone]  F: [phone] [] [facility]  Outpatient Rehabilitation &  Therapy  [address]  P: [phone]  F: [phone] [] [facility]  Outpatient Rehabilitation &  Therapy  [address]  P: [phone]  F: [phone] [] [facility]  Outpatient Rehabilitation &  Therapy  [address]  P: [phone]  F: [phone]  [] [facility] Outpatient Rehabilitation &  Therapy  [address]  P: [phone]  F: [phone]     Physical Therapy Daily Treatment Note    Date:  10/18/2024  Patient Name:  Mau Grijalva    :  1955  MRN: [id number]  Physician: Kimberley LIN-RUBINA                                              Insurance: Medicare, Med [place] (VBMN, $0 copay)  Medical Diagnosis: M25.552 (ICD-10-CM) - Left hip pain               Rehab Codes: M25.552, M62.81, R26.89  Onset date: 2024                             Next Dr's appt.: tbd  Visit# / total visits: 3/12; Progress note for Medicare patient due at visit 10     Cancels/No Shows: 0/0    Subjective:    Pain:  [x] Yes  [] No Location: L hip pain Pain Rating: (0-10 scale) 0/10  Pain altered

## 2024-10-21 ENCOUNTER — HOSPITAL ENCOUNTER (OUTPATIENT)
Dept: PHYSICAL THERAPY | Facility: CLINIC | Age: 69
Setting detail: THERAPIES SERIES
Discharge: HOME | End: 2024-10-21
Payer: MEDICARE

## 2024-10-21 PROCEDURE — 97110 THERAPEUTIC EXERCISES: CPT

## 2024-10-21 NOTE — FLOWSHEET NOTE
[] [facility]  Outpatient Rehabilitation &  Therapy  [address]  [phone]  [phone] [x] [facility]  Outpatient Rehabilitation &  Therapy  [address]  P: [phone]  F: [phone] [] [facility]  Outpatient Rehabilitation &  Therapy  [address]  P: [phone]  F: [phone] [] [facility]  Outpatient Rehabilitation &  Therapy  [address]  [phone]  [phone] [] [facility]  Outpatient Rehabilitation &  Therapy  [address]   P: [phone]  F: [phone]  [] [facility]  Outpatient Rehabilitation &  Therapy  [address]  P: [phone]  F: [phone] [] [facility]  Outpatient Rehabilitation &  Therapy  [address]  P: [phone]  F: [phone] [] [facility]  Outpatient Rehabilitation &  Therapy  [address]  P: [phone]  F: [phone] [] [facility]  Outpatient Rehabilitation &  Therapy  [address]  P: [phone]  F: [phone]  [] [facility] Outpatient Rehabilitation &  Therapy  [address]  P: [phone]  F: [phone]     Physical Therapy Daily Treatment Note    Date:  10/21/2024  Patient Name:  Mau Grijalva    :  1955  MRN: [id number]  Physician: Kimberley LIN-RUBINA                                              Insurance: Medicare, Med [place] (VBMN, $0 copay)  Medical Diagnosis: M25.552 (ICD-10-CM) - Left hip pain               Rehab Codes: M25.552, M62.81, R26.89  Onset date: 2024                             Next Dr's appt.: tbd  Visit# / total visits: 3/12; Progress note for Medicare patient due at visit 10     Cancels/No Shows: 0/0    Subjective:    Pain:  [x] Yes  [] No Location: L hip pain Pain Rating: (0-10 scale) 0/10  Pain altered

## 2024-10-24 ENCOUNTER — HOSPITAL ENCOUNTER (OUTPATIENT)
Dept: PHYSICAL THERAPY | Facility: CLINIC | Age: 69
Setting detail: THERAPIES SERIES
Discharge: HOME | End: 2024-10-24
Payer: MEDICARE

## 2024-10-24 NOTE — FLOWSHEET NOTE
[] [facility]  Outpatient Rehabilitation &  Therapy  [address]  [phone]  [phone] [x] [facility]  Outpatient Rehabilitation &  Therapy  [address]  P: [phone]  F: [phone] [] [facility]  Outpatient Rehabilitation &  Therapy  [address]  P: [phone]  F: [phone] [] [facility]  Outpatient Rehabilitation &  Therapy  [address]  [phone]  [phone] [] [facility]  Outpatient Rehabilitation &  Therapy  [address]   P: [phone]  F: [phone]  [] [facility]  Outpatient Rehabilitation &  Therapy  [address]  P: [phone]  F: [phone] [] [facility]  Outpatient Rehabilitation &  Therapy  [address]  P: [phone]  F: [phone] [] [facility]  Outpatient Rehabilitation &  Therapy  [address]  P: [phone]  F: [phone] [] [facility]  Outpatient Rehabilitation &  Therapy  [address]  P: [phone]  F: [phone]  [] [facility] Outpatient Rehabilitation &  Therapy  [address]  P: [phone]  F: [phone]     Therapy Cancel/No Show note    Date: 10/24/2024  Patient: Mau Grijalva  : 1955  MRN: [id number]    Cancels/No Shows to date: 0    For today's appointment patient:    [x]  Cancelled    [] Rescheduled appointment    [] No-show     Reason given by patient:    [x]  Patient ill    []  Conflicting appointment    [] No transportation      [] Conflict with work    [] No reason given    [] Weather related    [] COVID-19    [] Other:      Comments:        [x] Next appointment was confirmed    Electronically signed by: LORENE GHOSH, PTA

## 2024-10-29 ENCOUNTER — HOSPITAL ENCOUNTER (OUTPATIENT)
Dept: PHYSICAL THERAPY | Facility: CLINIC | Age: 69
Setting detail: THERAPIES SERIES
Discharge: HOME | End: 2024-10-29
Payer: MEDICARE

## 2024-10-29 PROCEDURE — 97110 THERAPEUTIC EXERCISES: CPT

## 2024-10-29 NOTE — FLOWSHEET NOTE
[] [facility]  Outpatient Rehabilitation &  Therapy  [address]  [phone]  [phone] [x] [facility]  Outpatient Rehabilitation &  Therapy  [address]  P: [phone]  F: [phone] [] [facility]  Outpatient Rehabilitation &  Therapy  [address]  P: [phone]  F: [phone] [] [facility]  Outpatient Rehabilitation &  Therapy  [address]  [phone]  [phone] [] [facility]  Outpatient Rehabilitation &  Therapy  [address]   P: [phone]  F: [phone]  [] [facility]  Outpatient Rehabilitation &  Therapy  [address]  P: [phone]  F: [phone] [] [facility]  Outpatient Rehabilitation &  Therapy  [address]  P: [phone]  F: [phone] [] [facility]  Outpatient Rehabilitation &  Therapy  [address]  P: [phone]  F: [phone] [] [facility]  Outpatient Rehabilitation &  Therapy  [address]  P: [phone]  F: [phone]  [] [facility] Outpatient Rehabilitation &  Therapy  [address]  P: [phone]  F: [phone]     Physical Therapy Daily Treatment Note    Date:  10/29/2024  Patient Name:  Mau Grijalva    :  1955  MRN: [id number]  Physician: Kimberley LIN-RUBINA                                              Insurance: Medicare, Med [place] (VBMN, $0 copay)  Medical Diagnosis: M25.552 (ICD-10-CM) - Left hip pain               Rehab Codes: M25.552, M62.81, R26.89  Onset date: 2024                             Next Dr's appt.: tbd  Visit# / total visits: ; Progress note for Medicare patient due at visit 10     Cancels/No Shows:1/0    Subjective:    Pain:  [] Yes  [x] No Location: L hip pain Pain Rating: (0-10 scale) 0/10  Pain altered  Early ambulation for DVT prophylaxis   General precautions

## 2024-10-31 ENCOUNTER — HOSPITAL ENCOUNTER (OUTPATIENT)
Dept: PHYSICAL THERAPY | Facility: CLINIC | Age: 69
Setting detail: THERAPIES SERIES
Discharge: HOME | End: 2024-10-31
Payer: MEDICARE

## 2024-10-31 PROCEDURE — 97110 THERAPEUTIC EXERCISES: CPT

## 2024-10-31 NOTE — FLOWSHEET NOTE
[] [facility]  Outpatient Rehabilitation &  Therapy  [address]  [phone]  [phone] [x] [facility]  Outpatient Rehabilitation &  Therapy  [address]  P: [phone]  F: [phone] [] [facility]  Outpatient Rehabilitation &  Therapy  [address]  P: [phone]  F: [phone] [] [facility]  Outpatient Rehabilitation &  Therapy  [address]  [phone]  [phone] [] [facility]  Outpatient Rehabilitation &  Therapy  [address]   P: [phone]  F: [phone]  [] [facility]  Outpatient Rehabilitation &  Therapy  [address]  P: [phone]  F: [phone] [] [facility]  Outpatient Rehabilitation &  Therapy  [address]  P: [phone]  F: [phone] [] [facility]  Outpatient Rehabilitation &  Therapy  [address]  P: [phone]  F: [phone] [] [facility]  Outpatient Rehabilitation &  Therapy  [address]  P: [phone]  F: [phone]  [] [facility] Outpatient Rehabilitation &  Therapy  [address]  P: [phone]  F: [phone]     Physical Therapy Daily Treatment Note    Date:  10/31/2024  Patient Name:  Mau Grijalva    :  1955  MRN: [id number]  Physician: Kimberley LIN-RUBINA                                              Insurance: Medicare, Med [place] (VBMN, $0 copay)  Medical Diagnosis: M25.552 (ICD-10-CM) - Left hip pain               Rehab Codes: M25.552, M62.81, R26.89  Onset date: 2024                             Next Dr's appt.: tbd  Visit# / total visits: ; Progress note for Medicare patient due at visit 10     Cancels/No Shows:1/0    Subjective:    Pain:  [] Yes  [x] No Location: L hip pain Pain Rating: (0-10 scale) 0/10  Pain altered

## 2024-11-04 ENCOUNTER — HOSPITAL ENCOUNTER (OUTPATIENT)
Dept: PHYSICAL THERAPY | Facility: CLINIC | Age: 69
Setting detail: THERAPIES SERIES
Discharge: HOME | End: 2024-11-04
Payer: MEDICARE

## 2024-11-04 PROCEDURE — 97110 THERAPEUTIC EXERCISES: CPT

## 2024-11-04 NOTE — FLOWSHEET NOTE
impairment to less than 8% impairment to demonstrate improved functional mobility []  []  []      2. Reduce LLE pain levels to 0-1/10 or less with all work related and recreational activities for improved quality of life.  []  []  []      3. Pt to report ability to sleep throughout the night without disturbance due to L leg pain for improved quality of life. [x]  []  []   MET 11/4- now able to sleep on L side   4. Pt to ambulate at least 300' with appropriate mechanics and without increased LLE pain to ease community ambulation. [x]  []  []                           Patient goals: reduce pain    Pt. Education:  [x] Yes  [] No  [x] Reviewed Prior HEP/Ed  Method of Education: [x] Verbal  [] Demo  [] Written  Access Code: [email or web address]  URL: [email or web address]  Date: 10/15/2024  Prepared by: Jocelyn Castellon    Exercises  - Supine Lower Trunk Rotation  - 1 x daily - 7 x weekly - 5 reps - 5\" hold  - Supine Sciatic Nerve Glide  - 1 x daily - 7 x weekly - 5 reps - 5\" hold  - Supine Bridge  - 1 x daily - 7 x weekly - 10 reps - 5\" hold  - Supine Figure 4 Piriformis Stretch  - 1 x daily - 7 x weekly - 2 sets - 30\" hold  - Clamshell with Resistance  - 1 x daily - 7 x weekly - 2 sets - 10 reps  - Prone Press Up  - 1 x daily - 7 x weekly - 5 reps - 10\" hold  - Prone Press Up On Elbows  - 1 x daily - 7 x weekly - 5 reps - 10\" hold    Added 10/18  - Prone Press Up  - 1 x daily - 7 x weekly - 5 reps - 10\" hold  - Prone Press Up On Elbows  - 1 x daily - 7 x weekly - 5 reps - 10\" hold  - Supine Hamstring Stretch with Strap  - 1 x daily - 7 x weekly - 2 reps - 30 seconds hold  - Supine Figure 4 Piriformis Stretch  - 1 x daily - 7 x weekly - 2 reps - 30 seconds hold  - Hooklying Single Leg Bent Knee Fallouts with Resistance  - 1 x daily - 7 x weekly - 10 reps  - Sidelying Reverse Clamshell with Resistance  - 1 x daily - 7 x weekly - 15 reps  - Sidelying Hip Abduction with Resistance at Thighs  - 1 x daily - 7 x weekly - 15 reps  -

## 2024-11-08 ENCOUNTER — HOSPITAL ENCOUNTER (OUTPATIENT)
Dept: PHYSICAL THERAPY | Facility: CLINIC | Age: 69
Setting detail: THERAPIES SERIES
Discharge: HOME | End: 2024-11-08
Payer: MEDICARE

## 2024-11-08 PROCEDURE — 97110 THERAPEUTIC EXERCISES: CPT

## 2024-11-08 NOTE — FLOWSHEET NOTE
[] [facility]  Outpatient Rehabilitation &  Therapy  [address]  [phone]  [phone] [x] [facility]  Outpatient Rehabilitation &  Therapy  [address]  P: [phone]  F: [phone] [] [facility]  Outpatient Rehabilitation &  Therapy  [address]  P: [phone]  F: [phone] [] [facility]  Outpatient Rehabilitation &  Therapy  [address]  [phone]  [phone] [] [facility]  Outpatient Rehabilitation &  Therapy  [address]   P: [phone]  F: [phone]  [] [facility]  Outpatient Rehabilitation &  Therapy  [address]  P: [phone]  F: [phone] [] [facility]  Outpatient Rehabilitation &  Therapy  [address]  P: [phone]  F: [phone] [] [facility]  Outpatient Rehabilitation &  Therapy  [address]  P: [phone]  F: [phone] [] [facility]  Outpatient Rehabilitation &  Therapy  [address]  P: [phone]  F: [phone]  [] [facility] Outpatient Rehabilitation &  Therapy  [address]  P: [phone]  F: [phone]     Physical Therapy Daily Treatment Note    Date:  2024  Patient Name:  Mau Grijalva    :  1955  MRN: [id number]  Physician: Kimberley LIN-RUBINA                                              Insurance: Medicare, Med [place] (VBMN, $0 copay)  Medical Diagnosis: M25.552 (ICD-10-CM) - Left hip pain               Rehab Codes: M25.552, M62.81, R26.89  Onset date: 2024                             Next Dr's appt.: tbd  Visit# / total visits: ; Progress note for Medicare patient due at visit 10     Cancels/No Shows:1/0    Subjective:    Pain:  [] Yes  [x] No Location: L hip pain Pain Rating: (0-10 scale) 4/10  Pain altered

## 2024-11-11 ENCOUNTER — HOSPITAL ENCOUNTER (OUTPATIENT)
Dept: PHYSICAL THERAPY | Facility: CLINIC | Age: 69
Setting detail: THERAPIES SERIES
Discharge: HOME | End: 2024-11-11
Payer: MEDICARE

## 2024-11-11 PROCEDURE — 97110 THERAPEUTIC EXERCISES: CPT

## 2024-11-11 NOTE — FLOWSHEET NOTE
performances. Immediately following warm up patient performed a series of standing stretches in order to further decrease any remaining LE muscle tension. Continued with previously charted ther-ex interventions to challenge balance, stability, and strength with intermittent cues required for technique. Progressed ex interventions as charted above with focus on global strength and control. Pt. Once again reported slight decrease in overall pain/discomfort, but does not formally rate.      [] No change.     [] Other:   [x] Patient would continue to benefit from skilled physical therapy services in order to: reduce L leg pain, improve lumbopelvic mobility, improve global LLE Strength and stability to ease difficulty with all daily activities including work and recreational exercise.     STG/LTG    Goals  MET NOT MET ON-  GOING  Details   Date Addressed: reassessed by primary PT 11/4           STG: To be met in 6 treatments            1. ? Pain: Decrease LLE pain levels to 3/10 or less to ease ADL progression. [x]  []  []      2. ? ROM: Increase LE flexibility with (-) MOON testing and bilateral SLR to at least 75 degrees to reduce difficulty with ADLs [x]  []  []      3. ? Strength: Increase B hip strength to 5/5 throughout to ease functional limitations and mobility  [x]  []  []      4. Independent with Home Exercise Programs with ability to demonstrate exercises without cueing for technique.  [x]  []  []                  Date Addressed:            LTG: To be met in 12 treatments           1. Improve score on assessment tool LEFI from 12.5% impairment to less than 8% impairment to demonstrate improved functional mobility []  []  []      2. Reduce LLE pain levels to 0-1/10 or less with all work related and recreational activities for improved quality of life.  []  []  []      3. Pt to report ability to sleep throughout the night without disturbance due to L leg pain for improved quality of life. [x]  []  []   MET

## 2024-11-15 ENCOUNTER — HOSPITAL ENCOUNTER (OUTPATIENT)
Dept: PHYSICAL THERAPY | Facility: CLINIC | Age: 69
Setting detail: THERAPIES SERIES
Discharge: HOME | End: 2024-11-15
Payer: MEDICARE

## 2024-11-15 PROCEDURE — 97110 THERAPEUTIC EXERCISES: CPT

## 2024-11-15 NOTE — FLOWSHEET NOTE
[] [facility]  Outpatient Rehabilitation &  Therapy  [address]  [phone]  [phone] [x] [facility]  Outpatient Rehabilitation &  Therapy  [address]  P: [phone]  F: [phone] [] [facility]  Outpatient Rehabilitation &  Therapy  [address]  P: [phone]  F: [phone] [] [facility]  Outpatient Rehabilitation &  Therapy  [address]  [phone]  [phone] [] [facility]  Outpatient Rehabilitation &  Therapy  [address]   P: [phone]  F: [phone]  [] [facility]  Outpatient Rehabilitation &  Therapy  [address]  P: [phone]  F: [phone] [] [facility]  Outpatient Rehabilitation &  Therapy  [address]  P: [phone]  F: [phone] [] [facility]  Outpatient Rehabilitation &  Therapy  [address]  P: [phone]  F: [phone] [] [facility]  Outpatient Rehabilitation &  Therapy  [address]  P: [phone]  F: [phone]  [] [facility] Outpatient Rehabilitation &  Therapy  [address]  P: [phone]  F: [phone]     Physical Therapy Daily Treatment Note    Date:  11/15/2024  Patient Name:  Mau Grijalva    :  1955  MRN: [id number]  Physician: Kimberley LNI-RUBINA                                              Insurance: Medicare, Med [place] (VBMN, $0 copay)  Medical Diagnosis: M25.552 (ICD-10-CM) - Left hip pain               Rehab Codes: M25.552, M62.81, R26.89  Onset date: 2024                             Next Dr's appt.: tbd  Visit# / total visits: 10/12; Progress note for Medicare patient due at visit 12     Cancels/No Shows:1/0    Subjective:    Pain:  [] Yes  [x] No Location: L hip pain Pain Rating: (0-10 scale) 4/10  Pain altered

## 2024-11-18 ENCOUNTER — HOSPITAL ENCOUNTER (OUTPATIENT)
Dept: PHYSICAL THERAPY | Facility: CLINIC | Age: 69
Setting detail: THERAPIES SERIES
Discharge: HOME | End: 2024-11-18
Payer: MEDICARE

## 2024-11-18 PROCEDURE — 97110 THERAPEUTIC EXERCISES: CPT

## 2024-11-18 NOTE — FLOWSHEET NOTE
[] [facility]  Outpatient Rehabilitation &  Therapy  [address]  [phone]  [phone] [x] [facility]  Outpatient Rehabilitation &  Therapy  [address]  P: [phone]  F: [phone] [] [facility]  Outpatient Rehabilitation &  Therapy  [address]  P: [phone]  F: [phone] [] [facility]  Outpatient Rehabilitation &  Therapy  [address]  [phone]  [phone] [] [facility]  Outpatient Rehabilitation &  Therapy  [address]   P: [phone]  F: [phone]  [] [facility]  Outpatient Rehabilitation &  Therapy  [address]  P: [phone]  F: [phone] [] [facility]  Outpatient Rehabilitation &  Therapy  [address]  P: [phone]  F: [phone] [] [facility]  Outpatient Rehabilitation &  Therapy  [address]  P: [phone]  F: [phone] [] [facility]  Outpatient Rehabilitation &  Therapy  [address]  P: [phone]  F: [phone]  [] [facility] Outpatient Rehabilitation &  Therapy  [address]  P: [phone]  F: [phone]     Physical Therapy Daily Treatment Note    Date:  2024  Patient Name:  Mau Grijalva    :  1955  MRN: [id number]  Physician: Kimberley LIN-RUBINA                                              Insurance: Medicare, Med [place] (VBMN, $0 copay)  Medical Diagnosis: M25.552 (ICD-10-CM) - Left hip pain               Rehab Codes: M25.552, M62.81, R26.89  Onset date: 2024                             Next Dr's appt.: tbd  Visit# / total visits: ; Progress note for Medicare patient due at visit 12     Cancels/No Shows:1/0    Subjective:    Pain:  [] Yes  [x] No Location: L hip pain/lateral knee Pain Rating: (0-10 scale)

## 2024-11-20 ENCOUNTER — HOSPITAL ENCOUNTER (OUTPATIENT)
Dept: PHYSICAL THERAPY | Facility: CLINIC | Age: 69
Setting detail: THERAPIES SERIES
Discharge: HOME | End: 2024-11-20
Payer: MEDICARE

## 2024-11-20 PROCEDURE — 97140 MANUAL THERAPY 1/> REGIONS: CPT

## 2024-11-20 PROCEDURE — 97110 THERAPEUTIC EXERCISES: CPT

## 2024-11-20 NOTE — FLOWSHEET NOTE
[] [facility]  Outpatient Rehabilitation &  Therapy  [address]  [phone]  [phone] [x] [facility]  Outpatient Rehabilitation &  Therapy  [address]  P: [phone]  F: [phone] [] [facility]  Outpatient Rehabilitation &  Therapy  [address]  P: [phone]  F: [phone] [] [facility]  Outpatient Rehabilitation &  Therapy  [address]  [phone]  [phone] [] [facility]  Outpatient Rehabilitation &  Therapy  [address]   P: [phone]  F: [phone]  [] [facility]  Outpatient Rehabilitation &  Therapy  [address]  P: [phone]  F: [phone] [] [facility]  Outpatient Rehabilitation &  Therapy  [address]  P: [phone]  F: [phone] [] [facility]  Outpatient Rehabilitation &  Therapy  [address]  P: [phone]  F: [phone] [] [facility]  Outpatient Rehabilitation &  Therapy  [address]  P: [phone]  F: [phone]  [] [facility] Outpatient Rehabilitation &  Therapy  [address]  P: [phone]  F: [phone]     Physical Therapy Daily Treatment Note    Date:  2024  Patient Name:  Mau Grijalva    :  1955  MRN: [id number]  Physician: Kimberley LIN-RUBINA                                              Insurance: Medicare, Med [place] (VBMN, $0 copay)  Medical Diagnosis: M25.552 (ICD-10-CM) - Left hip pain               Rehab Codes: M25.552, M62.81, R26.89  Onset date: 2024                             Next Dr's appt.: tbd  Visit# / total visits: ; Progress note for Medicare patient due at visit 12     Cancels/No Shows:1/0    Subjective:    Pain:  [] Yes  [x] No Location: L hip pain/lateral knee Pain Rating: (0-10 scale)

## 2024-11-20 NOTE — DISCHARGE SUMMARY
11/20/24    Subjective:    Pain:  [] Yes  [x] No   Location: L hip pain/lateral knee         Pain Rating: (0-10 scale) 2-3/10  Pain altered Tx:  [] No  [x] Yes  Action: held progressions, some regressions     Comments: Pt arrives with less pain through hip and knee today. Was a little sore after therapy but then improved the next day. [place] the massage gun helped as well as putting orthotics back into his shoes.     Assessment:  [x] Progressing toward goals: Initiated session with nustep to improve LE mobility and blood flow then continued with standing interventions progressing core and hip girdle strength with Pallof press and Pallof walk outs. Then spent time reassessing progress towards goals. Overall pt is making great progress in reduction of L hip pain, improved mobility, improve LLE strength, improved gait and function. Pt most limited by L hip pain after recent exacerbation of injury lifting heavy at work two weeks ago but is gradually improving. Ended with hypervolt to ease soft tissue tightness throughout posterior L hip and lateral HS/quad with good response. Plan to discharge pt to independent [facility] at this time. Pt agreeable to discharge, no further questions or concerns.                           [] No change.                           [] Other:    [x] Patient would continue to benefit from skilled physical therapy services in order to: reduce L leg pain, improve lumbopelvic mobility, improve global LLE Strength and stability to ease difficulty with all daily activities including work and recreational exercise.      Objective:    Functional Test: LEFI Score: 70/80 or 12.5% functionally impaired at initial evaluation Score: 74/80 or 7.5% impaired at 12th visit       Objective: reassessed 11/20            STRENGTH     Left Right   Hip Flex 5/5 5/5   Ext 5/5 5/5   ABD 5/5 5/5   ADD       Knee Flex 5/5 5/5   Ext 5/5 5/5   Ankle DF 4/5 5/5   PF 5/5 5/5   INV       EVER                     STG/LTG     Goals

## 2025-08-20 ENCOUNTER — OFFICE VISIT (OUTPATIENT)
Dept: FAMILY MEDICINE CLINIC | Age: 70
End: 2025-08-20
Payer: MEDICARE

## 2025-08-20 VITALS
TEMPERATURE: 97.6 F | WEIGHT: 198.7 LBS | BODY MASS INDEX: 30.21 KG/M2 | DIASTOLIC BLOOD PRESSURE: 88 MMHG | SYSTOLIC BLOOD PRESSURE: 110 MMHG | OXYGEN SATURATION: 98 % | HEART RATE: 66 BPM

## 2025-08-20 DIAGNOSIS — R19.7 DIARRHEA, UNSPECIFIED TYPE: ICD-10-CM

## 2025-08-20 DIAGNOSIS — R10.9 ABDOMINAL CRAMPING: ICD-10-CM

## 2025-08-20 DIAGNOSIS — K21.9 GASTROESOPHAGEAL REFLUX DISEASE WITHOUT ESOPHAGITIS: ICD-10-CM

## 2025-08-20 DIAGNOSIS — M25.552 LEFT HIP PAIN: Primary | ICD-10-CM

## 2025-08-20 PROCEDURE — 3017F COLORECTAL CA SCREEN DOC REV: CPT | Performed by: NURSE PRACTITIONER

## 2025-08-20 PROCEDURE — 1036F TOBACCO NON-USER: CPT | Performed by: NURSE PRACTITIONER

## 2025-08-20 PROCEDURE — G8417 CALC BMI ABV UP PARAM F/U: HCPCS | Performed by: NURSE PRACTITIONER

## 2025-08-20 PROCEDURE — 1160F RVW MEDS BY RX/DR IN RCRD: CPT | Performed by: NURSE PRACTITIONER

## 2025-08-20 PROCEDURE — 1123F ACP DISCUSS/DSCN MKR DOCD: CPT | Performed by: NURSE PRACTITIONER

## 2025-08-20 PROCEDURE — 99214 OFFICE O/P EST MOD 30 MIN: CPT | Performed by: NURSE PRACTITIONER

## 2025-08-20 PROCEDURE — G8427 DOCREV CUR MEDS BY ELIG CLIN: HCPCS | Performed by: NURSE PRACTITIONER

## 2025-08-20 PROCEDURE — 1159F MED LIST DOCD IN RCRD: CPT | Performed by: NURSE PRACTITIONER

## 2025-08-20 PROCEDURE — 96372 THER/PROPH/DIAG INJ SC/IM: CPT | Performed by: NURSE PRACTITIONER

## 2025-08-20 RX ORDER — PANTOPRAZOLE SODIUM 20 MG/1
20 TABLET, DELAYED RELEASE ORAL
Qty: 90 TABLET | Refills: 1 | Status: SHIPPED | OUTPATIENT
Start: 2025-08-20

## 2025-08-20 RX ORDER — METHYLPREDNISOLONE ACETATE 40 MG/ML
40 INJECTION, SUSPENSION INTRA-ARTICULAR; INTRALESIONAL; INTRAMUSCULAR; SOFT TISSUE ONCE
Status: COMPLETED | OUTPATIENT
Start: 2025-08-20 | End: 2025-08-20

## 2025-08-20 RX ORDER — PREDNISONE 20 MG/1
20 TABLET ORAL 2 TIMES DAILY
Qty: 10 TABLET | Refills: 0 | Status: SHIPPED | OUTPATIENT
Start: 2025-08-20 | End: 2025-08-25

## 2025-08-20 RX ADMIN — METHYLPREDNISOLONE ACETATE 40 MG: 40 INJECTION, SUSPENSION INTRA-ARTICULAR; INTRALESIONAL; INTRAMUSCULAR; SOFT TISSUE at 14:27

## 2025-08-20 SDOH — ECONOMIC STABILITY: FOOD INSECURITY: WITHIN THE PAST 12 MONTHS, YOU WORRIED THAT YOUR FOOD WOULD RUN OUT BEFORE YOU GOT MONEY TO BUY MORE.: NEVER TRUE

## 2025-08-20 SDOH — ECONOMIC STABILITY: FOOD INSECURITY: WITHIN THE PAST 12 MONTHS, THE FOOD YOU BOUGHT JUST DIDN'T LAST AND YOU DIDN'T HAVE MONEY TO GET MORE.: NEVER TRUE

## 2025-08-20 ASSESSMENT — PATIENT HEALTH QUESTIONNAIRE - PHQ9
SUM OF ALL RESPONSES TO PHQ QUESTIONS 1-9: 0
2. FEELING DOWN, DEPRESSED OR HOPELESS: NOT AT ALL
1. LITTLE INTEREST OR PLEASURE IN DOING THINGS: NOT AT ALL

## 2025-08-20 ASSESSMENT — ENCOUNTER SYMPTOMS
ABDOMINAL PAIN: 1
DIARRHEA: 1